# Patient Record
Sex: FEMALE | Race: BLACK OR AFRICAN AMERICAN | NOT HISPANIC OR LATINO | Employment: FULL TIME | URBAN - METROPOLITAN AREA
[De-identification: names, ages, dates, MRNs, and addresses within clinical notes are randomized per-mention and may not be internally consistent; named-entity substitution may affect disease eponyms.]

---

## 2017-03-09 ENCOUNTER — ALLSCRIPTS OFFICE VISIT (OUTPATIENT)
Dept: OTHER | Facility: OTHER | Age: 67
End: 2017-03-09

## 2017-07-20 ENCOUNTER — ALLSCRIPTS OFFICE VISIT (OUTPATIENT)
Dept: OTHER | Facility: OTHER | Age: 67
End: 2017-07-20

## 2017-07-20 DIAGNOSIS — I87.2 VENOUS INSUFFICIENCY (CHRONIC) (PERIPHERAL): ICD-10-CM

## 2017-07-20 DIAGNOSIS — E11.65 TYPE 2 DIABETES MELLITUS WITH HYPERGLYCEMIA (HCC): ICD-10-CM

## 2017-08-22 ENCOUNTER — GENERIC CONVERSION - ENCOUNTER (OUTPATIENT)
Dept: OTHER | Facility: OTHER | Age: 67
End: 2017-08-22

## 2017-08-22 LAB
AMBIG ABBREV CMP14 DEFAULT (HISTORICAL): NORMAL
AMBIG ABBREV LP DEFAULT (HISTORICAL): NORMAL

## 2017-08-23 LAB
A/G RATIO (HISTORICAL): 1.2 (ref 1.2–2.2)
ALBUMIN SERPL BCP-MCNC: 3.7 G/DL (ref 3.6–4.8)
ALP SERPL-CCNC: 68 IU/L (ref 39–117)
ALT SERPL W P-5'-P-CCNC: 32 IU/L (ref 0–32)
AST SERPL W P-5'-P-CCNC: 28 IU/L (ref 0–40)
BASOPHILS # BLD AUTO: 0 %
BASOPHILS # BLD AUTO: 0 X10E3/UL (ref 0–0.2)
BILIRUB SERPL-MCNC: 0.2 MG/DL (ref 0–1.2)
BUN SERPL-MCNC: 15 MG/DL (ref 8–27)
BUN/CREA RATIO (HISTORICAL): 20 (ref 12–28)
CALCIUM SERPL-MCNC: 9.5 MG/DL (ref 8.7–10.3)
CHLORIDE SERPL-SCNC: 102 MMOL/L (ref 96–106)
CHOLEST SERPL-MCNC: 159 MG/DL (ref 100–199)
CO2 SERPL-SCNC: 24 MMOL/L (ref 18–29)
CREAT SERPL-MCNC: 0.74 MG/DL (ref 0.57–1)
CREATININE, URINE (HISTORICAL): 61.6 MG/DL
DEPRECATED RDW RBC AUTO: 13.7 % (ref 12.3–15.4)
EGFR AFRICAN AMERICAN (HISTORICAL): 97 ML/MIN/1.73
EGFR-AMERICAN CALC (HISTORICAL): 84 ML/MIN/1.73
EOSINOPHIL # BLD AUTO: 0.3 X10E3/UL (ref 0–0.4)
EOSINOPHIL # BLD AUTO: 4 %
GLUCOSE SERPL-MCNC: 158 MG/DL (ref 65–99)
HBA1C MFR BLD HPLC: 7.4 % (ref 4.8–5.6)
HCT VFR BLD AUTO: 38.5 % (ref 34–46.6)
HDLC SERPL-MCNC: 41 MG/DL
HGB BLD-MCNC: 12.4 G/DL (ref 11.1–15.9)
IMM.GRANULOCYTES (CD4/8) (HISTORICAL): 0 %
IMM.GRANULOCYTES (CD4/8) (HISTORICAL): 0 X10E3/UL (ref 0–0.1)
LDLC SERPL CALC-MCNC: 84 MG/DL (ref 0–99)
LYMPHOCYTES # BLD AUTO: 1.7 X10E3/UL (ref 0.7–3.1)
LYMPHOCYTES # BLD AUTO: 22 %
MCH RBC QN AUTO: 26.5 PG (ref 26.6–33)
MCHC RBC AUTO-ENTMCNC: 32.2 G/DL (ref 31.5–35.7)
MCV RBC AUTO: 82 FL (ref 79–97)
MICROALBUM.,U,RANDOM (HISTORICAL): 20.2 UG/ML
MICROALBUMIN/CREATININE RATIO (HISTORICAL): 32.8 MG/G CREAT (ref 0–30)
MONOCYTES # BLD AUTO: 0.6 X10E3/UL (ref 0.1–0.9)
MONOCYTES (HISTORICAL): 8 %
NEUTROPHILS # BLD AUTO: 5.1 X10E3/UL (ref 1.4–7)
NEUTROPHILS # BLD AUTO: 66 %
PLATELET # BLD AUTO: 281 X10E3/UL (ref 150–379)
POTASSIUM SERPL-SCNC: 4.6 MMOL/L (ref 3.5–5.2)
RBC (HISTORICAL): 4.68 X10E6/UL (ref 3.77–5.28)
SODIUM SERPL-SCNC: 141 MMOL/L (ref 134–144)
TOT. GLOBULIN, SERUM (HISTORICAL): 3.2 G/DL (ref 1.5–4.5)
TOTAL PROTEIN (HISTORICAL): 6.9 G/DL (ref 6–8.5)
TRIGL SERPL-MCNC: 168 MG/DL (ref 0–149)
TSH SERPL DL<=0.05 MIU/L-ACNC: 5.35 UIU/ML (ref 0.45–4.5)
VLDLC SERPL CALC-MCNC: 34 MG/DL (ref 5–40)
WBC # BLD AUTO: 7.8 X10E3/UL (ref 3.4–10.8)

## 2017-08-31 ENCOUNTER — ALLSCRIPTS OFFICE VISIT (OUTPATIENT)
Dept: OTHER | Facility: OTHER | Age: 67
End: 2017-08-31

## 2017-08-31 DIAGNOSIS — R94.6 ABNORMAL RESULTS OF THYROID FUNCTION STUDIES: ICD-10-CM

## 2017-09-01 ENCOUNTER — GENERIC CONVERSION - ENCOUNTER (OUTPATIENT)
Dept: OTHER | Facility: OTHER | Age: 67
End: 2017-09-01

## 2017-09-02 LAB
T3FREE SERPL-MCNC: 3.1 PG/ML (ref 2–4.4)
T4 FREE SERPL-MCNC: 1.07 NG/DL (ref 0.82–1.77)
TSH SERPL DL<=0.05 MIU/L-ACNC: 3.12 UIU/ML (ref 0.45–4.5)

## 2017-09-06 ENCOUNTER — GENERIC CONVERSION - ENCOUNTER (OUTPATIENT)
Dept: OTHER | Facility: OTHER | Age: 67
End: 2017-09-06

## 2017-09-08 ENCOUNTER — GENERIC CONVERSION - ENCOUNTER (OUTPATIENT)
Dept: OTHER | Facility: OTHER | Age: 67
End: 2017-09-08

## 2017-09-14 ENCOUNTER — HOSPITAL ENCOUNTER (OUTPATIENT)
Dept: RADIOLOGY | Age: 67
Discharge: HOME | End: 2017-09-14
Payer: COMMERCIAL

## 2017-09-14 ENCOUNTER — GENERIC CONVERSION - ENCOUNTER (OUTPATIENT)
Dept: OTHER | Facility: OTHER | Age: 67
End: 2017-09-14

## 2017-09-14 DIAGNOSIS — R94.6 ABNORMAL RESULTS OF THYROID FUNCTION STUDIES: ICD-10-CM

## 2017-09-14 DIAGNOSIS — I87.2 VENOUS INSUFFICIENCY (CHRONIC) (PERIPHERAL): ICD-10-CM

## 2017-09-14 DIAGNOSIS — E11.65 TYPE 2 DIABETES MELLITUS WITH HYPERGLYCEMIA (HCC): ICD-10-CM

## 2017-09-14 PROCEDURE — 76536 US EXAM OF HEAD AND NECK: CPT

## 2017-09-14 PROCEDURE — G0202 SCR MAMMO BI INCL CAD: HCPCS

## 2017-10-05 DIAGNOSIS — Z12.31 ENCOUNTER FOR SCREENING MAMMOGRAM FOR MALIGNANT NEOPLASM OF BREAST: ICD-10-CM

## 2017-10-05 DIAGNOSIS — Z00.00 ENCOUNTER FOR GENERAL ADULT MEDICAL EXAMINATION WITHOUT ABNORMAL FINDINGS: ICD-10-CM

## 2017-12-01 DIAGNOSIS — E11.65 TYPE 2 DIABETES MELLITUS WITH HYPERGLYCEMIA (HCC): ICD-10-CM

## 2018-01-12 NOTE — RESULT NOTES
Verified Results  [facility]) Thyroxine (T4) Free, Direct, S [id number] 08:31AM [employer]     Test Name Result Flag Reference   T4,Free(Direct) 1 07 ng/dL  0 82-1 77     (1) TSH [id number] 08:31AM [facility]     Test Name Result Flag Reference   TSH 3 120 uIU/mL  0 450-4 500     (1) FREE T3 30Ttx0323 08:31AM [facility] [employer]     Test Name Result Flag Reference   Triiodothyronine,Free,Serum 3 1 pg/mL  2 0-4 4

## 2018-01-13 NOTE — RESULT NOTES
Verified Results  * MAMMO SCREENING BILATERAL W CAD [id number] 01:02PM Yogesh Javed Order Number: [id number]    - Patient Instructions: To schedule this appointment, please contact Central Scheduling at [phone]  Do not wear any perfume, powder, lotion or deodorant on breast or underarm area  Please bring your doctors order, referral (if needed) and insurance information with you on the day of the test  Failure to bring this information may result in this test being rescheduled  Arrive 15 minutes prior to your appointment time to register  On the day of your test, please bring any prior mammogram or breast studies with you that were not performed at a [facility]  Failure to bring prior exams may result in your test needing to be rescheduled  Test Name Result Flag Reference   MAMMO SCREENING BILATERAL W CAD (Report)     Patient History:   Patient is postmenopausal and is nulliparous  Family history of breast cancer at age 48 in maternal aunt,    ovarian cancer at age [de-identified] in mother, prostate cancer at age 61 in    brother, breast cancer at age 61 in sister  No Hormone Replacement Therapy   Patient has never smoked  Patient's BMI is 35 5  Reason for exam: screening, asymptomatic  Mammo Screening Bilateral W CAD: September 14, 2017 - Check In #:   [de-identified]   Bilateral CC and MLO view(s) were taken  Technologist: RT Krishan(TOMMY)(M)   Prior study comparison: August 4, 2016, mammo screening bilateral   W CAD performed at [facility]  September 10,    2014, digital bilateral screening mammogram performed at [facility]  April 8, 2013, digital bilateral    screening mammogram performed at [facility]  April 2, 2012, digital bilateral screening mammogram performed at   [facility]  February 14, 2011, digital    bilateral screening mammogram performed at [facility]       There are scattered fibroglandular densities  The parenchymal pattern appears stable  No dominant soft tissue    mass or suspicious calcifications are noted  The skin and nipple   contours are within normal limits  No mammographic evidence of malignancy  No    significant changes when compared with prior studies  ACR BI-RADSï¾® Assessments: BiRad:1 - Negative     Recommendation:   Routine screening mammogram in 1 year  Analyzed by CAD     The patient is scheduled in a reminder system for screening    mammography  8-10% of cancers will be missed on mammography  Management of a    palpable abnormality must be based on clinical grounds  Patients   will be notified of their results via letter from our facility  Accredited by [employer] of Radiology and FDA  Transcription Location: [facility] 98: [id number]     Risk Value(s):   Tyrer-Cuzick 10 Year: 12 400%, Tyrer-Cuzick Lifetime: 22 700%,    Myriad Table: 3 0%, LAUREN 5 Year: 2 7%, NCI Lifetime: 8 7%, MRS    : Based on personal and/or family history,    consideration of hereditary risk assessment may be warranted     Signed by:   Rachell Harmon MD   9/14/17

## 2018-01-13 NOTE — PROGRESS NOTES
Assessment    1  TMJ syndrome (524 69) (M26 629)   2  Acute URI (465 9) (J06 9)    Plan  Acute URI    · Azithromycin 250 MG Oral Tablet; 2tabs stat on day one then 1tab daily day 2 thr 5  Encounter for gynecological examination    · (1) THIN PREP PAP WITH IMAGING; Status:Temporary Deferral - Pt refuses;    3/9/2018  Maturation index required? : No  HPV? : Never  TMJ syndrome    · Meloxicam 15 MG Oral Tablet (Mobic); TAKE 1 TABLET DAILY AS NEEDED    Discussion/Summary    Consider dentist consult  Chief Complaint  patient presented here for left ear pain      History of Present Illness  HPI: pt complains of left ear pain  sharp pain and down her left neck  denies grinding her teeth  it hurts to chew  +dry cough      Review of Systems    Constitutional: No fever, no chills, feels well, no tiredness, no recent weight gain or loss  ENT: no ear ache, no loss of hearing, no nosebleeds or nasal discharge, no sore throat or hoarseness  Cardiovascular: no complaints of slow or fast heart rate, no chest pain, no palpitations, no leg claudication or lower extremity edema  Respiratory: no complaints of shortness of breath, no wheezing, no dyspnea on exertion, no orthopnea or PND  Active Problems    1  Arthritis (716 90) (M19 90)   2  Colon cancer screening (V76 51) (Z12 11)   3  Diabetes type 2, uncontrolled (250 02) (E11 65)   4  Encounter for screening for osteoporosis (V82 81) (Z13 820)   5  Encounter for screening mammogram for malignant neoplasm of breast (V76 12)   (Z12 31)   6  Flu vaccine need (V04 81) (Z23)   7  Need for Tdap vaccination (V06 1) (Z23)   8  Shortness of breath on exertion (786 05) (R06 02)   9  Toothache (525 9) (K08 89)   10  Urinary incontinence (788 30) (R32)   11  Venous insufficiency (459 81) (I87 2)   12  Vertigo (780 4) (R42)   13  Wound of lower extremity, left, initial encounter (894 0) (C79 549X)    Past Medical History    1   History of Abdominal pain, epigastric (789 06) (R10 13)   2  History of Acute pain of right thigh (729 5) (M79 651)   3  Acute sinusitis (461 9) (J01 90)   4  History of Arthritis of wrist, right (716 93) (M19 031)   5  History of Cellulitis Of The Ankle (682 6)   6  History of ovarian cyst (V13 29) (Z87 42)   7  History of shortness of breath (V13 89) (Z87 898)   8  History of urinary frequency (V13 09) (Z87 898)   9  History of urinary frequency (V13 09) (Z87 898)   10  History of Kienbock's disease, 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)   11  History of Limb pain (729 5) (M79 609)   12  History of Limb swelling (729 81) (M79 89)   13  History of Localized Soft Tissue Swelling In Both Ankles   14  History of Lumbar strain (847 2) (S39 012A)   15  History of Pain in right wrist (719 43) (M25 531)   16  History of Pain in wrist, unspecified laterality (719 43) (M25 539)  Active Problems And Past Medical History Reviewed: The active problems and past medical history were reviewed and updated today  Family History  Mother    1  Family history of Carcinoma Of The Stomach (V16 0)   2  Family history of Diabetes Mellitus (V18 0)   3  Family history of Hypertension (V17 49)  Family History Reviewed: The family history was reviewed and updated today  Social History    · Denied: History of Alcohol Use (History)   · Daily Coffee Consumption (___ Cups/Day)   · Daily Tea Consumption (___ Cups/Day)   ·    · Never a smoker   · Denied: History of Tobacco Use   · Uses Safety Equipment - Seatbelts  The social history was reviewed and updated today  The social history was reviewed and is unchanged  Surgical History    1  History of Cholecystectomy   2  History of Complete Colonoscopy  Surgical History Reviewed: The surgical history was reviewed and updated today  Current Meds   1  Aspirin 81 MG Oral Tablet Chewable; Take 1 tablet daily; Therapy: 48QUU3077 to (Evaluate:24Jan2017); Last Rx:53Tla7988 Ordered   2   Calcium 500 500-250-200 MG-MG-UNIT Oral Tablet; Therapy: 27Hyt5949 to (Last Rx:98Umu5289)  Requested for: 72Osi4016 Ordered   3  Meclizine HCl - 12 5 MG Oral Tablet; Take 1 tablet 3 times daily as needed; Therapy: 77Dbr6495 to (Last Rx:48Zxs1175)  Requested for: 35Zoq4150 Ordered   4  Multiple Vitamin TABS; Therapy: 67Qfc1004 to (Last Rx:73Kon9997)  Requested for: 29Cbf3224 Ordered   5  Vitamin C 500 MG Oral Tablet Chewable; Therapy: 53Iep8649 to (Last Rx:40Frh0017)  Requested for: 82Ifk4891 Ordered    The medication list was reviewed and updated today  Allergies    1  No Known Drug Allergies    Vitals   Recorded: 13NLM0677 08:12AM   Temperature 97 6 F, Tympanic   Heart Rate 74   Pulse Quality Normal   Respiration Quality Normal   Respiration 16   Systolic 526, RUE, Sitting   Diastolic 72, RUE, Sitting   Height 5 ft 4 in   Weight 219 lb 8 oz   BMI Calculated 37 68   BSA Calculated 2 03   O2 Saturation 97     Physical Exam    Constitutional   General appearance: No acute distress, well appearing and well nourished  Eyes   Conjunctiva and lids: No swelling, erythema or discharge  Pupils and irises: Equal, round and reactive to light  Ears, Nose, Mouth, and Throat   Oropharynx: Abnormal   tenderness over left tmj jt  Pulmonary   Respiratory effort: No increased work of breathing or signs of respiratory distress  Auscultation of lungs: Clear to auscultation  Cardiovascular   Auscultation of heart: Normal rate and rhythm, normal S1 and S2, without murmurs  Examination of extremities for edema and/or varicosities: Normal          Results/Data  PHQ-2 Adult Depression Screening 89VCH2107 08:13AM User, s     Test Name Result Flag Reference   PHQ-2 Adult Depression Screening Negative     PHQ-2 Adult Depression Score 0     Over the last two weeks, how often have you been bothered by any of the following problems?   Little interest or pleasure in doing things: Not at all - 0  Feeling down, depressed, or hopeless: Not at all - 0 Falls Risk Assessment (Dx Z13 89 Screen for Neurologic Disorder) 63MOE8197 08:13AM User, Ahs     Test Name Result Flag Reference   Falls Risk      No falls in the past year       Signatures   Electronically signed by : Aniceto Habermann, DO; Mar  9 2017 10:05AM EST                       (Author)

## 2018-01-14 VITALS
SYSTOLIC BLOOD PRESSURE: 124 MMHG | BODY MASS INDEX: 37.15 KG/M2 | RESPIRATION RATE: 16 BRPM | TEMPERATURE: 97 F | HEART RATE: 70 BPM | WEIGHT: 217.6 LBS | HEIGHT: 64 IN | OXYGEN SATURATION: 97 % | DIASTOLIC BLOOD PRESSURE: 80 MMHG

## 2018-01-14 VITALS
HEART RATE: 68 BPM | RESPIRATION RATE: 16 BRPM | BODY MASS INDEX: 37.94 KG/M2 | DIASTOLIC BLOOD PRESSURE: 74 MMHG | HEIGHT: 64 IN | SYSTOLIC BLOOD PRESSURE: 122 MMHG | TEMPERATURE: 97.6 F | OXYGEN SATURATION: 98 % | WEIGHT: 222.2 LBS

## 2018-01-14 VITALS
BODY MASS INDEX: 37.47 KG/M2 | SYSTOLIC BLOOD PRESSURE: 126 MMHG | WEIGHT: 219.5 LBS | OXYGEN SATURATION: 97 % | HEIGHT: 64 IN | RESPIRATION RATE: 16 BRPM | DIASTOLIC BLOOD PRESSURE: 72 MMHG | HEART RATE: 74 BPM | TEMPERATURE: 97.6 F

## 2018-01-15 NOTE — RESULT NOTES
Calc 95 mg/dL  0-99     (1) HEMOGLOBIN A1C 03LEO0733 08:10AM [employer]     Test Name Result Flag Reference   Hemoglobin A1c 7 1 % H 4 8-5 6   Pre-diabetes: 5 7 - 6 4           Diabetes: >6 4           Glycemic control for adults with diabetes: <7 0     (LC) Sedimentation Rate-Westergren 02WLL0859 08:10AM [employer]     Test Name Result Flag Reference   Sedimentation Rate-Westergren 12 mm/hr  0-40     (1) C-REACTIVE PROTEIN 29IYP2931 08:10AM [employer]     Test Name Result Flag Reference   C-Reactive Protein, Quant 8 7 mg/L H 0 0-4 9     (1) PREALBUMIN 57ZBX5366 08:10AM [employer]     Test Name Result Flag Reference   Prealbumin 14 mg/dL  10-36     [facility]) Cy Pyo ESQ99 Default 83GUK3115 08:10AM [employer]     Test Name Result Flag Reference   Cy Pyo CMP14 Default Comment     A hand-written panel/profile was received from your office  In  accordance with the [facility] Ambiguous Test Code Policy dated July 6005, we have completed your order by using the closest currently  or formerly recognized AMA panel  We have assigned Comprehensive  Metabolic Panel (14), Test Code #135356 to this request   If this  is not the testing you wished to receive on this specimen, please  contact the [address] [place] [address] [employer] Inquiry/Technical Services Department  to clarify the test order  We appreciate your business  [facility]) Cy Pyo LP Default 82MQM1406 08:10AM [employer]     Test Name Result Flag Reference   Ambig Abbrev LP Default Comment     A hand-written panel/profile was received from your office  In  accordance with the [facility] Ambiguous Test Code Policy dated July 1964, we have completed your order by using the closest currently  or formerly recognized AMA panel    We have assigned Lipid Panel,  Test Code #748587 to this request  If this is not the testing you  wished to receive on this specimen, please contact the [address] [place] [address]  [employer] Inquiry/Technical Services Department to clarify the test  order  We appreciate your business

## 2018-02-20 RX ORDER — MULTIVITAMIN
TABLET ORAL
COMMUNITY
Start: 2011-04-20 | End: 2018-09-20

## 2018-02-20 RX ORDER — MELOXICAM 15 MG/1
1 TABLET ORAL DAILY PRN
COMMUNITY
Start: 2017-03-09 | End: 2018-02-21 | Stop reason: SDUPTHER

## 2018-02-20 RX ORDER — ASPIRIN 81 MG/1
1 TABLET, CHEWABLE ORAL DAILY
COMMUNITY
Start: 2016-07-28

## 2018-02-20 RX ORDER — MULTIVIT-MIN/IRON/FOLIC ACID/K 18-600-40
CAPSULE ORAL
COMMUNITY
Start: 2011-04-20

## 2018-02-20 RX ORDER — TOCOPHERSOLAN (VITAMIN E TPGS) 400/15ML
LIQUID (ML) ORAL
COMMUNITY
Start: 2011-04-20 | End: 2018-09-20

## 2018-02-21 ENCOUNTER — OFFICE VISIT (OUTPATIENT)
Dept: FAMILY MEDICINE CLINIC | Facility: CLINIC | Age: 68
End: 2018-02-21
Payer: COMMERCIAL

## 2018-02-21 VITALS
HEIGHT: 64 IN | SYSTOLIC BLOOD PRESSURE: 108 MMHG | RESPIRATION RATE: 16 BRPM | TEMPERATURE: 97.5 F | HEART RATE: 74 BPM | OXYGEN SATURATION: 98 % | DIASTOLIC BLOOD PRESSURE: 64 MMHG | WEIGHT: 221.6 LBS | BODY MASS INDEX: 37.83 KG/M2

## 2018-02-21 DIAGNOSIS — L98.499 SUPERFICIAL ULCER (HCC): Primary | ICD-10-CM

## 2018-02-21 DIAGNOSIS — L30.9 ECZEMA, UNSPECIFIED TYPE: ICD-10-CM

## 2018-02-21 DIAGNOSIS — E11.9 TYPE 2 DIABETES MELLITUS WITHOUT COMPLICATION, WITHOUT LONG-TERM CURRENT USE OF INSULIN (HCC): ICD-10-CM

## 2018-02-21 PROBLEM — E04.1 THYROID NODULE: Status: ACTIVE | Noted: 2017-09-18

## 2018-02-21 PROCEDURE — 99214 OFFICE O/P EST MOD 30 MIN: CPT | Performed by: INTERNAL MEDICINE

## 2018-02-21 RX ORDER — LANCETS
EACH MISCELLANEOUS DAILY
Qty: 100 EACH | Refills: 1 | Status: SHIPPED | OUTPATIENT
Start: 2018-02-21

## 2018-02-21 RX ORDER — SULFAMETHOXAZOLE AND TRIMETHOPRIM 800; 160 MG/1; MG/1
1 TABLET ORAL EVERY 12 HOURS SCHEDULED
Qty: 20 TABLET | Refills: 0 | Status: SHIPPED | OUTPATIENT
Start: 2018-02-21 | End: 2018-03-03

## 2018-02-21 RX ORDER — MELOXICAM 15 MG/1
15 TABLET ORAL DAILY PRN
Qty: 30 TABLET | Refills: 3 | Status: SHIPPED | OUTPATIENT
Start: 2018-02-21 | End: 2018-06-12 | Stop reason: SDUPTHER

## 2018-02-21 RX ORDER — METHYLPREDNISOLONE 4 MG/1
TABLET ORAL
Qty: 21 TABLET | Refills: 0 | Status: SHIPPED | OUTPATIENT
Start: 2018-02-21 | End: 2018-03-12 | Stop reason: ALTCHOICE

## 2018-02-21 NOTE — PROGRESS NOTES
Assessment/Plan:         Diagnoses and all orders for this visit:    Superficial ulcer (HCC)  -     sulfamethoxazole-trimethoprim (BACTRIM DS) 800-160 mg per tablet; Take 1 tablet by mouth every 12 (twelve) hours for 10 days  -     mupirocin (BACTROBAN) 2 % ointment; Apply topically 2 (two) times a day    Type 2 diabetes mellitus without complication, without long-term current use of insulin (HCC)  -     Blood Glucose Monitoring Suppl ([address]) w/Device KIT; by Does not apply route daily  -     ONE TOUCH CLUB LANCETS MISC; by Does not apply route daily  -     glucose blood (ONE TOUCH TEST STRIPS) test strip; Use as instructed  -     metFORMIN (GLUCOPHAGE) 500 mg tablet; Take 1 tablet (500 mg total) by mouth daily with breakfast    Eczema, unspecified type  -     Methylprednisolone 4 MG TBPK; Use as directed on package    Other orders  -     aspirin 81 mg chewable tablet; Chew 1 tablet daily  -     Calcium-Magnesium-Vitamin D (CALCIUM 500) 500-250-200 MG-MG-UNIT TABS; Take by mouth  -     meloxicam (MOBIC) 15 mg tablet; Take 1 tablet by mouth daily as needed  -     Discontinue: metFORMIN (GLUCOPHAGE) 500 mg tablet; Take 1 tablet by mouth daily  -     Multiple Vitamin tablet; Take by mouth  -     Ascorbic Acid (VITAMIN C) 500 MG CAPS; Take by mouth          Subjective:      Patient ID: Aline Bethea is a 76 y o  female  Pt states has rash x 2 weeks  She went to [place] and was given cream(?steroid) and pill(?antihistamine)  She scratched her leg open  The following portions of the patient's history were reviewed and updated as appropriate: She  has no past medical history on file  She   Patient Active Problem List    Diagnosis Date Noted    Thyroid nodule 09/18/2017    Diabetes type 2, uncontrolled ([facility] [address] ) 06/13/2016    Arthritis 06/18/2012     She  has no past surgical history on file  Her family history includes No Known Problems in her mother  She  reports that she has never smoked  She has never used smokeless tobacco  She reports that she does not drink alcohol or use drugs  Current Outpatient Prescriptions   Medication Sig Dispense Refill    Ascorbic Acid (VITAMIN C) 500 MG CAPS Take by mouth      aspirin 81 mg chewable tablet Chew 1 tablet daily      Calcium-Magnesium-Vitamin D (CALCIUM 500) 500-250-200 MG-MG-UNIT TABS Take by mouth      meloxicam (MOBIC) 15 mg tablet Take 1 tablet by mouth daily as needed      metFORMIN (GLUCOPHAGE) 500 mg tablet Take 1 tablet (500 mg total) by mouth daily with breakfast 30 tablet 5    Multiple Vitamin tablet Take by mouth      Blood Glucose Monitoring Suppl ([address]) w/Device KIT by Does not apply route daily 1 each 0    glucose blood (ONE TOUCH TEST STRIPS) test strip Use as instructed 100 each 0    Methylprednisolone 4 MG TBPK Use as directed on package 21 tablet 0    mupirocin (BACTROBAN) 2 % ointment Apply topically 2 (two) times a day 22 g 3    ONE TOUCH CLUB LANCETS MISC by Does not apply route daily 100 each 1    sulfamethoxazole-trimethoprim (BACTRIM DS) 800-160 mg per tablet Take 1 tablet by mouth every 12 (twelve) hours for 10 days 20 tablet 0     No current facility-administered medications for this visit  No current outpatient prescriptions on file prior to visit  No current facility-administered medications on file prior to visit  She has No Known Allergies       Review of Systems   Constitutional: Negative  HENT: Negative  Eyes: Negative  Respiratory: Negative  Cardiovascular: Negative  Objective:      /64 (BP Location: Left arm, Patient Position: Sitting, Cuff Size: Standard)   Pulse 74   Temp 97 5 °F (36 4 °C)   Resp 16   Ht 5' 4" (1 626 m)   Wt 101 kg (221 lb 9 6 oz)   SpO2 98%   BMI 38 04 kg/m²          Physical Exam   Constitutional: She appears well-developed and well-nourished  HENT:   Head: Normocephalic and atraumatic     Skin:   eczma on left arm and superficial ankle ulcer left    Redressed with [facility] [place]

## 2018-02-21 NOTE — PATIENT INSTRUCTIONS
Redressed her superficial ulcer  tx with antibiotic/oinment/add steroid - low dose for rash  She is aware it can push up her sugar and will stop if her gluc is >300

## 2018-03-12 ENCOUNTER — OFFICE VISIT (OUTPATIENT)
Dept: FAMILY MEDICINE CLINIC | Facility: CLINIC | Age: 68
End: 2018-03-12
Payer: COMMERCIAL

## 2018-03-12 VITALS
BODY MASS INDEX: 37.49 KG/M2 | WEIGHT: 219.6 LBS | DIASTOLIC BLOOD PRESSURE: 80 MMHG | HEIGHT: 64 IN | OXYGEN SATURATION: 98 % | HEART RATE: 75 BPM | SYSTOLIC BLOOD PRESSURE: 122 MMHG | TEMPERATURE: 97.9 F

## 2018-03-12 DIAGNOSIS — E11.9 DIABETIC EYE EXAM (HCC): ICD-10-CM

## 2018-03-12 DIAGNOSIS — Z01.00 DIABETIC EYE EXAM (HCC): ICD-10-CM

## 2018-03-12 DIAGNOSIS — I87.2 VENOUS INSUFFICIENCY: ICD-10-CM

## 2018-03-12 DIAGNOSIS — K12.1 HARD PALATE ULCER: ICD-10-CM

## 2018-03-12 DIAGNOSIS — Z12.11 SCREEN FOR COLON CANCER: ICD-10-CM

## 2018-03-12 DIAGNOSIS — Z13.5 SCREENING FOR GLAUCOMA: ICD-10-CM

## 2018-03-12 DIAGNOSIS — I83.023 STASIS ULCER OF ANKLE, LEFT (HCC): ICD-10-CM

## 2018-03-12 DIAGNOSIS — L30.9 DERMATITIS: ICD-10-CM

## 2018-03-12 DIAGNOSIS — L97.329 STASIS ULCER OF ANKLE, LEFT (HCC): ICD-10-CM

## 2018-03-12 DIAGNOSIS — E11.9 TYPE 2 DIABETES MELLITUS WITHOUT COMPLICATION, WITH LONG-TERM CURRENT USE OF INSULIN (HCC): Primary | ICD-10-CM

## 2018-03-12 DIAGNOSIS — E04.1 THYROID NODULE: ICD-10-CM

## 2018-03-12 DIAGNOSIS — Z79.4 TYPE 2 DIABETES MELLITUS WITHOUT COMPLICATION, WITH LONG-TERM CURRENT USE OF INSULIN (HCC): Primary | ICD-10-CM

## 2018-03-12 PROBLEM — R94.6 ABNORMAL FINDING ON THYROID FUNCTION TEST: Status: ACTIVE | Noted: 2017-08-31

## 2018-03-12 LAB
CREAT UR-MCNC: 92.1 MG/DL
MICROALBUMIN UR-MCNC: 11 MG/L (ref 0–20)
MICROALBUMIN/CREAT 24H UR: 12 MG/G CREATININE (ref 0–30)
SL AMB POCT HEMOGLOBIN AIC: NORMAL

## 2018-03-12 PROCEDURE — 82043 UR ALBUMIN QUANTITATIVE: CPT | Performed by: PHYSICIAN ASSISTANT

## 2018-03-12 PROCEDURE — 83036 HEMOGLOBIN GLYCOSYLATED A1C: CPT | Performed by: PHYSICIAN ASSISTANT

## 2018-03-12 PROCEDURE — 82570 ASSAY OF URINE CREATININE: CPT | Performed by: PHYSICIAN ASSISTANT

## 2018-03-12 PROCEDURE — 1101F PT FALLS ASSESS-DOCD LE1/YR: CPT | Performed by: PHYSICIAN ASSISTANT

## 2018-03-12 PROCEDURE — 3061F NEG MICROALBUMINURIA REV: CPT | Performed by: INTERNAL MEDICINE

## 2018-03-12 PROCEDURE — 99214 OFFICE O/P EST MOD 30 MIN: CPT | Performed by: PHYSICIAN ASSISTANT

## 2018-03-12 RX ORDER — CLOTRIMAZOLE AND BETAMETHASONE DIPROPIONATE 10; .64 MG/G; MG/G
CREAM TOPICAL 2 TIMES DAILY
Qty: 30 G | Refills: 0 | Status: SHIPPED | OUTPATIENT
Start: 2018-03-12 | End: 2018-09-20

## 2018-03-12 NOTE — PROGRESS NOTES
Assessment/Plan:         Diagnoses and all orders for this visit:    Type 2 diabetes mellitus without complication, with long-term current use of insulin ([facility])  -     POCT hemoglobin A1c  -     Microalbumin / creatinine urine ratio    Screening for glaucoma    Screen for colon cancer    Diabetic eye exam ([facility] )    Venous insufficiency    Stasis ulcer of ankle, left ([facility])          Subjective:      Patient ID: Yuridia Viera is a 76 y o  female  Patient presents for follow up chronic conditions  Patient has non-insulin diabetes mellitus type 2 controlled with metformin 500 mg once a day  Patient checks her blood sugar daily  Patient has complications of venous stasis dermatitis with ulceration recurrence on the left lower leg currently healing ulceration left inner ankle  A1c in office today was 6 4 urine microalbumin sent to lab  Patient has osteoarthritis for which she takes meloxicam as needed  Diabetic foot exam completed today  The following portions of the patient's history were reviewed and updated as appropriate:   She  has no past medical history on file    She   Patient Active Problem List    Diagnosis Date Noted    Thyroid nodule 09/18/2017    Abnormal finding on thyroid function test 08/31/2017    Diabetes type 2, uncontrolled ([facility] ) 06/13/2016    Arthritis 06/18/2012    Venous insufficiency 06/18/2012     Current Outpatient Prescriptions   Medication Sig Dispense Refill    Ascorbic Acid (VITAMIN C) 500 MG CAPS Take by mouth      aspirin 81 mg chewable tablet Chew 1 tablet daily      Blood Glucose Monitoring Suppl ([address]) w/Device KIT by Does not apply route daily 1 each 0    Calcium-Magnesium-Vitamin D (CALCIUM 500) 500-250-200 MG-MG-UNIT TABS Take by mouth      glucose blood (ONE TOUCH TEST STRIPS) test strip Use as instructed 100 each 0    meloxicam (MOBIC) 15 mg tablet Take 1 tablet (15 mg total) by mouth daily as needed for moderate pain 30 tablet 3    metFORMIN (GLUCOPHAGE) 500 mg tablet Take 1 tablet (500 mg total) by mouth daily with breakfast 30 tablet 5    Multiple Vitamin tablet Take by mouth      mupirocin (BACTROBAN) 2 % ointment Apply topically 2 (two) times a day 22 g 3    ONE TOUCH CLUB LANCETS MISC by Does not apply route daily 100 each 1     No current facility-administered medications for this visit  Current Outpatient Prescriptions on File Prior to Visit   Medication Sig    Ascorbic Acid (VITAMIN C) 500 MG CAPS Take by mouth    aspirin 81 mg chewable tablet Chew 1 tablet daily    Blood Glucose Monitoring Suppl ([address]) w/Device KIT by Does not apply route daily    Calcium-Magnesium-Vitamin D (CALCIUM 500) 500-250-200 MG-MG-UNIT TABS Take by mouth    glucose blood (ONE TOUCH TEST STRIPS) test strip Use as instructed    meloxicam (MOBIC) 15 mg tablet Take 1 tablet (15 mg total) by mouth daily as needed for moderate pain    metFORMIN (GLUCOPHAGE) 500 mg tablet Take 1 tablet (500 mg total) by mouth daily with breakfast    Multiple Vitamin tablet Take by mouth    mupirocin (BACTROBAN) 2 % ointment Apply topically 2 (two) times a day    ONE TOUCH CLUB LANCETS MISC by Does not apply route daily    [DISCONTINUED] Methylprednisolone 4 MG TBPK Use as directed on package     No current facility-administered medications on file prior to visit  She has No Known Allergies       Review of Systems   Constitutional: Positive for fatigue  Negative for unexpected weight change  HENT: Negative for ear pain  Eyes: Negative for visual disturbance  Respiratory: Positive for shortness of breath  Negative for cough, chest tightness and wheezing  Cardiovascular: Negative for chest pain, palpitations and leg swelling  Gastrointestinal: Negative for abdominal pain, constipation and diarrhea  Genitourinary: Positive for urgency  Negative for dysuria  Neurological: Positive for dizziness   Negative for syncope, light-headedness and headaches  Objective:      /80 (BP Location: Left arm, Patient Position: Sitting, Cuff Size: Standard)   Pulse 75   Temp 97 9 °F (36 6 °C)   Ht 5' 4" (1 626 m)   Wt 99 6 kg (219 lb 9 6 oz)   SpO2 98%   BMI 37 69 kg/m²          Physical Exam   Constitutional: She is oriented to person, place, and time  She appears well-developed and well-nourished  No distress  Overweight      American  female   HENT:   Head: Normocephalic  Right Ear: External ear normal    Left Ear: External ear normal    Pt  Has  Ulceration   Hard  Palate  Roof of  Mouth    Possible  Thrush  From  Recent  Steroid  Use  Eyes: Conjunctivae are normal  Pupils are equal, round, and reactive to light  Neck: Carotid bruit is not present  No thyromegaly present  Cardiovascular: Normal rate, regular rhythm, normal heart sounds and intact distal pulses  Pulses are no weak pulses  No murmur heard  Pulses:       Dorsalis pedis pulses are 2+ on the left side  Pulmonary/Chest: Effort normal and breath sounds normal    Abdominal: Soft  Bowel sounds are normal  She exhibits no mass  There is no tenderness  Surgical  Scar  Right upper  quadrant   Musculoskeletal: She exhibits no edema  Feet:   Right Foot:   Skin Integrity: Negative for ulcer, skin breakdown, erythema, warmth, callus or dry skin  Left Foot:   Skin Integrity: Positive for ulcer (pt  has  chronic  venous  stasis  changes  right  lower  ankle  recurrent  healling  stasis  ulcer)  Lymphadenopathy:     She has no cervical adenopathy  Neurological: She is alert and oriented to person, place, and time  Skin: Skin is warm and dry  Rash noted  Very  Superficial  Noninfected  Healing  Stasis  Ulcer   Left inner  Ankle  Dry  ithcy  Rash   Top  Of  Back  Pt has  Been  Applying  lotrisone  Cream  With  relief   Psychiatric: She has a normal mood and affect   Her behavior is normal  Thought content normal      Patient's shoes and socks removed  Right Foot/Ankle   Right Foot Inspection  Skin Exam: skin normal and skin intact no dry skin, no warmth, no callus, no erythema, no maceration, no abnormal color, no pre-ulcer, no ulcer and no callus                          Toe Exam: ROM and strength within normal limits  Sensory   Vibration: intact    Monofilament testing: intact      Left Foot/Ankle  Left Foot Inspection  Skin Exam: ulcer (pt  has  chronic  venous  stasis  changes  right  lower  ankle  recurrent  healling  stasis  ulcer)                         Toe Exam: ROM and strength within normal limits                   Sensory   Vibration: intact    Monofilament: intact  Vascular    The left DP pulse is 2+  Assign Risk Category:  Deformity present; No loss of protective sensation;  No weak pulses       Risk: 1

## 2018-03-18 DIAGNOSIS — E04.1 NONTOXIC SINGLE THYROID NODULE: ICD-10-CM

## 2018-05-19 DIAGNOSIS — E11.9 TYPE 2 DIABETES MELLITUS WITHOUT COMPLICATION, WITHOUT LONG-TERM CURRENT USE OF INSULIN (HCC): ICD-10-CM

## 2018-06-12 DIAGNOSIS — L98.499 SUPERFICIAL ULCER (HCC): ICD-10-CM

## 2018-06-12 RX ORDER — MELOXICAM 15 MG/1
15 TABLET ORAL DAILY PRN
Qty: 30 TABLET | Refills: 3 | Status: SHIPPED | OUTPATIENT
Start: 2018-06-12 | End: 2018-10-12 | Stop reason: SDUPTHER

## 2018-09-14 ENCOUNTER — OFFICE VISIT (OUTPATIENT)
Dept: FAMILY MEDICINE CLINIC | Facility: CLINIC | Age: 68
End: 2018-09-14
Payer: COMMERCIAL

## 2018-09-14 VITALS
BODY MASS INDEX: 36.65 KG/M2 | WEIGHT: 220 LBS | SYSTOLIC BLOOD PRESSURE: 110 MMHG | OXYGEN SATURATION: 97 % | HEART RATE: 76 BPM | TEMPERATURE: 97.4 F | HEIGHT: 65 IN | RESPIRATION RATE: 16 BRPM | DIASTOLIC BLOOD PRESSURE: 72 MMHG

## 2018-09-14 DIAGNOSIS — Z00.00 MEDICARE ANNUAL WELLNESS VISIT, SUBSEQUENT: Primary | ICD-10-CM

## 2018-09-14 DIAGNOSIS — Z23 NEED FOR VACCINATION FOR STREP PNEUMONIAE: ICD-10-CM

## 2018-09-14 DIAGNOSIS — M65.331 TRIGGER MIDDLE FINGER OF RIGHT HAND: ICD-10-CM

## 2018-09-14 DIAGNOSIS — Z12.11 SCREEN FOR COLON CANCER: ICD-10-CM

## 2018-09-14 DIAGNOSIS — E11.9 TYPE 2 DIABETES MELLITUS WITHOUT COMPLICATION, WITHOUT LONG-TERM CURRENT USE OF INSULIN (HCC): ICD-10-CM

## 2018-09-14 DIAGNOSIS — I87.2 VENOUS INSUFFICIENCY: ICD-10-CM

## 2018-09-14 DIAGNOSIS — Z13.820 SCREENING FOR OSTEOPOROSIS: ICD-10-CM

## 2018-09-14 DIAGNOSIS — L97.321 VENOUS STASIS ULCER OF LEFT ANKLE LIMITED TO BREAKDOWN OF SKIN WITH VARICOSE VEINS (HCC): ICD-10-CM

## 2018-09-14 DIAGNOSIS — I83.023 VENOUS STASIS ULCER OF LEFT ANKLE LIMITED TO BREAKDOWN OF SKIN WITH VARICOSE VEINS (HCC): ICD-10-CM

## 2018-09-14 LAB — SL AMB POCT HEMOGLOBIN AIC: 6.1

## 2018-09-14 PROCEDURE — 99214 OFFICE O/P EST MOD 30 MIN: CPT | Performed by: PHYSICIAN ASSISTANT

## 2018-09-14 PROCEDURE — G0439 PPPS, SUBSEQ VISIT: HCPCS | Performed by: PHYSICIAN ASSISTANT

## 2018-09-14 PROCEDURE — 83036 HEMOGLOBIN GLYCOSYLATED A1C: CPT | Performed by: PHYSICIAN ASSISTANT

## 2018-09-14 PROCEDURE — 90471 IMMUNIZATION ADMIN: CPT

## 2018-09-14 PROCEDURE — 90670 PCV13 VACCINE IM: CPT

## 2018-09-14 PROCEDURE — 3044F HG A1C LEVEL LT 7.0%: CPT | Performed by: PHYSICIAN ASSISTANT

## 2018-09-14 NOTE — PROGRESS NOTES
Assessment/Plan:     Diagnoses and all orders for this visit:    Medicare annual wellness visit, subsequent    Type 2 diabetes mellitus without complication, without long-term current use of insulin ([facility] [address] )  Comments:    Diabetes is at goal continue low carb diet metformin once a day  Check blood sugar daily  Orders:  -     POCT hemoglobin A1c  -     Comprehensive metabolic panel  -     Lipid panel  -     metFORMIN (GLUCOPHAGE) 500 mg tablet; Take 1 tablet (500 mg total) by mouth daily with breakfast    Screen for colon cancer  -     Ambulatory referral to Colorectal Surgery; Future    Screening for osteoporosis  -     DXA bone density spine hip and pelvis; Future    Need for vaccination for Strep pneumoniae  -     PNEUMOCOCCAL CONJUGATE VACCINE 13-VALENT LESS THAN 5Y0  (Prevnar 13)    Venous insufficiency  Comments:    Continue to wear compression socks elevate legs  Trigger middle finger of right hand  Comments:    Referred orthopedic  Orders:  -     silver sulfadiazine (SILVADENE,SSD) 1 % cream; Apply topically daily    Venous stasis ulcer of left ankle limited to breakdown of skin with varicose veins (HCC)  Comments:   continue compression stockings  Switch to Silvadene cream   Orders:  -     Ambulatory referral to Orthopedic Surgery; Future          Subjective:      Patient ID: Rebekah Caro is a 76 y o  female  Patient presents for follow up chronic conditions  Patient has non-insulin diabetes mellitus type 2  She is currently on metformin 500 mg once a day  Patient checks her blood sugar regularly  Patient is due for routine diabetic eye exam   A1c drawn here in office  Patient also has chronic venous insufficiency she wears compression stockings  He has 1 spot on the left medial malleolus of her ankle that is hard to close  Patient has been using Bactroban ointment will switch to Silvadene  Patient would like to go see Orthopedics for right 3rd finger which has a trigger finger    Patient will get flu vaccine at work Prevnar 13 given today  Patient is due for mammogram thyroid ultrasound and a routine colonoscopy A1c in office is 6 1  The following portions of the patient's history were reviewed and updated as appropriate:   She  has no past medical history on file  She   Patient Active Problem List    Diagnosis Date Noted    Trigger middle finger of right hand 09/14/2018    Venous stasis ulcer of left ankle limited to breakdown of skin with varicose veins (HCC) 09/14/2018    Thyroid nodule 09/18/2017    Abnormal finding on thyroid function test 08/31/2017    Type 2 diabetes mellitus without complication, without long-term current use of insulin ([facility] [address] ) 06/13/2016    Arthritis 06/18/2012    Venous insufficiency 06/18/2012     She  reports that she has never smoked  She has never used smokeless tobacco  She reports that she does not drink alcohol or use drugs    Current Outpatient Prescriptions   Medication Sig Dispense Refill    Ascorbic Acid (VITAMIN C) 500 MG CAPS Take by mouth      aspirin 81 mg chewable tablet Chew 1 tablet daily      Blood Glucose Monitoring Suppl ([address]) w/Device KIT by Does not apply route daily 1 each 0    Calcium-Magnesium-Vitamin D (CALCIUM 500) 500-250-200 MG-MG-UNIT TABS Take by mouth      clotrimazole-betamethasone (LOTRISONE) 1-0 05 % cream Apply topically 2 (two) times a day 30 g 0    meloxicam (MOBIC) 15 mg tablet TAKE 1 TABLET (15 MG TOTAL) BY MOUTH DAILY AS NEEDED FOR MODERATE PAIN 30 tablet 3    metFORMIN (GLUCOPHAGE) 500 mg tablet Take 1 tablet (500 mg total) by mouth daily with breakfast 90 tablet 0    Multiple Vitamin tablet Take by mouth      mupirocin (BACTROBAN) 2 % ointment Apply topically 2 (two) times a day 22 g 3    ONE TOUCH CLUB LANCETS MISC by Does not apply route daily 100 each 1    ONE TOUCH ULTRA TEST test strip USE AS INSTRUCTED 100 each 1    silver sulfadiazine (SILVADENE,SSD) 1 % cream Apply topically daily 50 g 0     No current facility-administered medications for this visit  Current Outpatient Prescriptions on File Prior to Visit   Medication Sig    Ascorbic Acid (VITAMIN C) 500 MG CAPS Take by mouth    aspirin 81 mg chewable tablet Chew 1 tablet daily    Blood Glucose Monitoring Suppl ([address]) w/Device KIT by Does not apply route daily    Calcium-Magnesium-Vitamin D (CALCIUM 500) 500-250-200 MG-MG-UNIT TABS Take by mouth    clotrimazole-betamethasone (LOTRISONE) 1-0 05 % cream Apply topically 2 (two) times a day    meloxicam (MOBIC) 15 mg tablet TAKE 1 TABLET (15 MG TOTAL) BY MOUTH DAILY AS NEEDED FOR MODERATE PAIN    Multiple Vitamin tablet Take by mouth    mupirocin (BACTROBAN) 2 % ointment Apply topically 2 (two) times a day    ONE TOUCH CLUB LANCETS MISC by Does not apply route daily    ONE TOUCH ULTRA TEST test strip USE AS INSTRUCTED    [DISCONTINUED] metFORMIN (GLUCOPHAGE) 500 mg tablet Take 1 tablet (500 mg total) by mouth daily with breakfast    [DISCONTINUED] nystatin (MYCOSTATIN) 100,000 units/mL suspension Apply 5 mL (500,000 Units total) to the mouth or throat 4 (four) times a day (Patient not taking: Reported on 9/14/2018 )     No current facility-administered medications on file prior to visit  She has No Known Allergies       Review of Systems   Constitutional: Negative for activity change, appetite change and unexpected weight change  HENT: Negative for ear pain and sore throat  Eyes: Negative for visual disturbance  Respiratory: Negative for cough, shortness of breath and wheezing  Cardiovascular: Negative for chest pain and leg swelling  Gastrointestinal: Negative for abdominal pain, blood in stool, constipation, diarrhea, nausea and vomiting  Genitourinary: Positive for urgency  Negative for difficulty urinating  Musculoskeletal: Negative for arthralgias and myalgias  Skin: Positive for wound  Negative for rash     Neurological: Negative for dizziness, syncope, light-headedness and headaches  Psychiatric/Behavioral: Negative for self-injury, sleep disturbance and suicidal ideas  The patient is not nervous/anxious  Objective:        Physical Exam   Constitutional: She is oriented to person, place, and time  She appears well-developed and well-nourished  HENT:   Head: Normocephalic  Right Ear: Tympanic membrane and external ear normal    Left Ear: Tympanic membrane and external ear normal    Mouth/Throat: Oropharynx is clear and moist    Eyes: Conjunctivae and EOM are normal  Pupils are equal, round, and reactive to light  Neck: Normal range of motion  No thyromegaly present  Cardiovascular: Normal rate, regular rhythm and normal heart sounds  No murmur heard  Pulmonary/Chest: Effort normal and breath sounds normal  She has no wheezes  Abdominal: Soft  Bowel sounds are normal  She exhibits no mass  There is no tenderness  Musculoskeletal: She exhibits deformity  She exhibits no edema  Right 3rd finger tender at flexor tendon in the palm  Some locking  Lymphadenopathy:     She has no cervical adenopathy  Neurological: She is alert and oriented to person, place, and time  She has normal reflexes  No cranial nerve deficit  Skin: Skin is warm and dry  No rash noted  Small venous stasis ulcer left medial malleolus   Psychiatric: She has a normal mood and affect  Her behavior is normal  Judgment and thought content normal    Nursing note and vitals reviewed

## 2018-09-18 LAB
ALBUMIN SERPL-MCNC: 3.9 G/DL (ref 3.6–4.8)
ALBUMIN/GLOB SERPL: 1.1 {RATIO} (ref 1.2–2.2)
ALP SERPL-CCNC: 63 IU/L (ref 39–117)
ALT SERPL-CCNC: 45 IU/L (ref 0–32)
AMBIG ABBREV DEFAULT: NORMAL
AMBIG ABBREV DEFAULT: NORMAL
AST SERPL-CCNC: 43 IU/L (ref 0–40)
BILIRUB SERPL-MCNC: 0.4 MG/DL (ref 0–1.2)
BUN SERPL-MCNC: 11 MG/DL (ref 8–27)
BUN/CREAT SERPL: 13 (ref 12–28)
CALCIUM SERPL-MCNC: 9.5 MG/DL (ref 8.7–10.3)
CHLORIDE SERPL-SCNC: 100 MMOL/L (ref 96–106)
CHOLEST SERPL-MCNC: 144 MG/DL (ref 100–199)
CO2 SERPL-SCNC: 24 MMOL/L (ref 20–29)
CREAT SERPL-MCNC: 0.83 MG/DL (ref 0.57–1)
GLOBULIN SER-MCNC: 3.4 G/DL (ref 1.5–4.5)
GLUCOSE SERPL-MCNC: 113 MG/DL (ref 65–99)
HDLC SERPL-MCNC: 46 MG/DL
LDLC SERPL CALC-MCNC: 80 MG/DL (ref 0–99)
POTASSIUM SERPL-SCNC: 4.4 MMOL/L (ref 3.5–5.2)
PROT SERPL-MCNC: 7.3 G/DL (ref 6–8.5)
SL AMB EGFR AFRICAN AMERICAN: 84 ML/MIN/1.73
SL AMB EGFR NON AFRICAN AMERICAN: 73 ML/MIN/1.73
SL AMB VLDL CHOLESTEROL CALC: 18 MG/DL (ref 5–40)
SODIUM SERPL-SCNC: 138 MMOL/L (ref 134–144)
TRIGL SERPL-MCNC: 91 MG/DL (ref 0–149)

## 2018-09-20 ENCOUNTER — OFFICE VISIT (OUTPATIENT)
Dept: OBGYN CLINIC | Facility: CLINIC | Age: 68
End: 2018-09-20
Payer: COMMERCIAL

## 2018-09-20 VITALS
DIASTOLIC BLOOD PRESSURE: 77 MMHG | HEART RATE: 72 BPM | SYSTOLIC BLOOD PRESSURE: 119 MMHG | HEIGHT: 66 IN | WEIGHT: 219 LBS | BODY MASS INDEX: 35.2 KG/M2

## 2018-09-20 DIAGNOSIS — M65.331 TRIGGER FINGER, RIGHT MIDDLE FINGER: Primary | ICD-10-CM

## 2018-09-20 PROCEDURE — 20550 NJX 1 TENDON SHEATH/LIGAMENT: CPT | Performed by: ORTHOPAEDIC SURGERY

## 2018-09-20 PROCEDURE — 99213 OFFICE O/P EST LOW 20 MIN: CPT | Performed by: ORTHOPAEDIC SURGERY

## 2018-09-20 RX ORDER — CHLORAL HYDRATE 500 MG
1000 CAPSULE ORAL DAILY
COMMUNITY

## 2018-09-20 RX ORDER — DICLOFENAC POTASSIUM 50 MG/1
100 TABLET, FILM COATED ORAL DAILY
COMMUNITY
End: 2021-07-19 | Stop reason: SDUPTHER

## 2018-09-20 RX ORDER — TRIAMCINOLONE ACETONIDE 40 MG/ML
20 INJECTION, SUSPENSION INTRA-ARTICULAR; INTRAMUSCULAR
Status: COMPLETED | OUTPATIENT
Start: 2018-09-20 | End: 2018-09-20

## 2018-09-20 RX ORDER — LIDOCAINE HYDROCHLORIDE 10 MG/ML
0.5 INJECTION, SOLUTION INFILTRATION; PERINEURAL
Status: COMPLETED | OUTPATIENT
Start: 2018-09-20 | End: 2018-09-20

## 2018-09-20 RX ADMIN — LIDOCAINE HYDROCHLORIDE 0.5 ML: 10 INJECTION, SOLUTION INFILTRATION; PERINEURAL at 10:42

## 2018-09-20 RX ADMIN — TRIAMCINOLONE ACETONIDE 20 MG: 40 INJECTION, SUSPENSION INTRA-ARTICULAR; INTRAMUSCULAR at 10:42

## 2018-09-20 NOTE — PROGRESS NOTES
CHIEF COMPLAINT:  Chief Complaint   Patient presents with    Right Wrist - Pain    Right Middle Finger - Pain, Swelling       SUBJECTIVE:  Linda Nguyễn is a 76y o  year old RHD female who presents to the office for pain and locking of her right long finger  Pt states that's that this has been going on for aprox 3 months  Pt states that this is worse at night  Pt states that she must manually open her finger when it gets stuck  Pt denies any injury to the right hand  Pt denies numbness and tingling  Pt is diabetic and takes metformin  PAST MEDICAL HISTORY:  Past Medical History:   Diagnosis Date    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[facility] [address] )     Skin disorder     Vascular disorder        PAST SURGICAL HISTORY:  History reviewed  No pertinent surgical history      FAMILY HISTORY:  Family History   Problem Relation Age of Onset    Diabetes Mother     Hypertension Mother        SOCIAL HISTORY:  Social History   Substance Use Topics    Smoking status: Never Smoker    Smokeless tobacco: Never Used    Alcohol use No       MEDICATIONS:    Current Outpatient Prescriptions:     Ascorbic Acid (VITAMIN C) 500 MG CAPS, Take by mouth, Disp: , Rfl:     aspirin 81 mg chewable tablet, Chew 1 tablet daily, Disp: , Rfl:     Blood Glucose Monitoring Suppl ([address]) w/Device KIT, by Does not apply route daily, Disp: 1 each, Rfl: 0    diclofenac potassium (CATAFLAM) 50 mg tablet, Take 100 mg by mouth daily, Disp: , Rfl:     meloxicam (MOBIC) 15 mg tablet, TAKE 1 TABLET (15 MG TOTAL) BY MOUTH DAILY AS NEEDED FOR MODERATE PAIN, Disp: 30 tablet, Rfl: 3    metFORMIN (GLUCOPHAGE) 500 mg tablet, Take 1 tablet (500 mg total) by mouth daily with breakfast, Disp: 90 tablet, Rfl: 0    mupirocin (BACTROBAN) 2 % ointment, Apply topically 2 (two) times a day, Disp: 22 g, Rfl: 3    Omega-3 Fatty Acids (FISH OIL) 1,000 mg, Take 1,000 mg by mouth daily, Disp: , Rfl:     ONE TOUCH CLUB LANCETS [facility], by Does not apply route daily, Disp: 100 each, Rfl: 1    ONE TOUCH ULTRA TEST test strip, USE AS INSTRUCTED, Disp: 100 each, Rfl: 1    silver sulfadiazine (SILVADENE,SSD) 1 % cream, Apply topically daily, Disp: 50 g, Rfl: 0    ALLERGIES:  No Known Allergies    REVIEW OF SYSTEMS:  Review of Systems   Constitutional: Negative for chills, fever and unexpected weight change  HENT: Positive for ear pain  Negative for hearing loss, nosebleeds and sore throat  Eyes: Negative for pain, redness and visual disturbance  Respiratory: Negative for cough, shortness of breath and wheezing  Cardiovascular: Negative for chest pain, palpitations and leg swelling  Gastrointestinal: Negative for abdominal pain, nausea and vomiting  Endocrine: Negative for polydipsia and polyuria  Genitourinary: Negative for dysuria and hematuria  Musculoskeletal: Positive for arthralgias and joint swelling  Negative for myalgias  Skin: Positive for wound  Negative for rash  Neurological: Negative for dizziness, numbness and headaches  Psychiatric/Behavioral: Negative for decreased concentration, dysphoric mood and suicidal ideas  The patient is not nervous/anxious          VITALS:  Vitals:    09/20/18 1012   BP: 119/77   Pulse: 72       LABS:  HgA1c:   Lab Results   Component Value Date    HGBA1C 6 1 09/14/2018     BMP:   Lab Results   Component Value Date    GLUCOSE 158 (H) 08/22/2017    CALCIUM 9 5 08/22/2017     08/22/2017    K 4 6 08/22/2017    CO2 24 09/17/2018     09/17/2018    BUN 11 09/17/2018    CREATININE 0 83 09/17/2018       _____________________________________________________  PHYSICAL EXAMINATION:  General: well developed and well nourished, alert, oriented times 3 and appears comfortable  Psychiatric: Normal  HEENT: Trachea Midline, No torticollis  Pulmonary: No audible wheezing or respiratory distress   Skin: No masses, erthema, lacerations, fluctation, ulcerations  Neurovascular: Sensation Intact to the Median, Ulnar, Radial Nerve, Motor Intact to the Median, Ulnar, Radial Nerve and Pulses Intact    MUSCULOSKELETAL EXAMINATION:    right long finger:  Positive palpable nodule over the A1 pulley  Positive tenderness to palpation over A1 pulley  Positive catching  Positive clicking       ___________________________________________________  STUDIES REVIEWED:  No studies reviewed  PROCEDURES PERFORMED:  Hand/upper extremity injection  Date/Time: 9/20/2018 10:42 AM  Consent given by: patient  Site marked: site marked  Timeout: Immediately prior to procedure a time out was called to verify the correct patient, procedure, equipment, support staff and site/side marked as required   Supporting Documentation  Indications: tendon swelling and pain   Procedure Details  Condition:trigger finger Location: long finger - R long A1   Preparation: Patient was prepped and draped in the usual sterile fashion  Ultrasound guidance: no  Medications administered: 0 5 mL lidocaine 1 %; 20 mg triamcinolone acetonide 40 mg/mL  Patient tolerance: patient tolerated the procedure well with no immediate complications  Dressing:  Sterile dressing applied            _____________________________________________________  ASSESSMENT/PLAN:    Right long finger trigger finger  * CSI was administered today with out complications  *Pt was advised of the possible rise in her blood sugar due to the CSI   *Pt was advised to apply warmth to the finger if it continues to be stiff or locks prior to ,manually opening it  Pt will call the office if she has any questions or concerns  Follow Up:  Return in about 2 weeks (around 10/4/2018)  To Do Next Visit:  Re-evaluation of current issue    General Discussions:  Trigger Finger: The anatomy and physiology of trigger finger was discussed with the patient today in the office    Edema and increased contact pressure within the flexor tendons at the A1 pulley can cause pain, crepitation, and triggering or locking of the digit resulting in limitation of function  Symptoms can occur at anytime but are typically worse in the morning or after a brief rest from repetitive activity  Treatment options include resting/nighttime MP blocking splints to decrease edema, oral anti-inflammatory medications, home or formal therapy exercises, up to 2 steroid injections within the tendon sheath, or surgical release  While majority of patients do respond to conservative treatment, up to 20% may require surgical release           Scribe Attestation    I,:   Paz Hazel am acting as a scribe while in the presence of the attending physician :        I,:   Trena John MD personally performed the services described in this documentation    as scribed in my presence :

## 2018-09-24 ENCOUNTER — TELEPHONE (OUTPATIENT)
Dept: FAMILY MEDICINE CLINIC | Facility: CLINIC | Age: 68
End: 2018-09-24

## 2018-09-27 ENCOUNTER — TELEPHONE (OUTPATIENT)
Dept: FAMILY MEDICINE CLINIC | Facility: CLINIC | Age: 68
End: 2018-09-27

## 2018-09-27 DIAGNOSIS — R74.8 ELEVATED LIVER ENZYMES: Primary | ICD-10-CM

## 2018-09-27 NOTE — TELEPHONE ENCOUNTER
Spoke  With  Pt   elevated    ast and  Alt  Repeat   hepatic  Functions  Will  Also  check  Hepatitis  Panel  For  Pt is a nurse

## 2018-10-12 DIAGNOSIS — L98.499 SUPERFICIAL ULCER (HCC): ICD-10-CM

## 2018-10-12 RX ORDER — MELOXICAM 15 MG/1
15 TABLET ORAL DAILY PRN
Qty: 90 TABLET | Refills: 0 | Status: SHIPPED | OUTPATIENT
Start: 2018-10-12 | End: 2018-10-17 | Stop reason: SDUPTHER

## 2018-10-15 DIAGNOSIS — E11.9 TYPE 2 DIABETES MELLITUS WITHOUT COMPLICATION, WITHOUT LONG-TERM CURRENT USE OF INSULIN (HCC): ICD-10-CM

## 2018-10-17 DIAGNOSIS — L98.499 SUPERFICIAL ULCER (HCC): ICD-10-CM

## 2018-10-17 DIAGNOSIS — M17.0 OSTEOARTHRITIS OF BOTH KNEES, UNSPECIFIED OSTEOARTHRITIS TYPE: Primary | ICD-10-CM

## 2018-10-17 RX ORDER — MELOXICAM 15 MG/1
15 TABLET ORAL DAILY PRN
Qty: 90 TABLET | Refills: 0 | Status: SHIPPED | OUTPATIENT
Start: 2018-10-17 | End: 2019-02-20 | Stop reason: SDUPTHER

## 2018-11-12 ENCOUNTER — TRANSCRIBE ORDERS (OUTPATIENT)
Dept: RADIOLOGY | Facility: CLINIC | Age: 68
End: 2018-11-12

## 2018-11-13 ENCOUNTER — OFFICE VISIT (OUTPATIENT)
Dept: FAMILY MEDICINE CLINIC | Facility: CLINIC | Age: 68
End: 2018-11-13
Payer: COMMERCIAL

## 2018-11-13 VITALS
BODY MASS INDEX: 35.1 KG/M2 | HEART RATE: 73 BPM | DIASTOLIC BLOOD PRESSURE: 82 MMHG | HEIGHT: 66 IN | WEIGHT: 218.4 LBS | TEMPERATURE: 97.8 F | OXYGEN SATURATION: 97 % | SYSTOLIC BLOOD PRESSURE: 124 MMHG

## 2018-11-13 DIAGNOSIS — I83.023 VENOUS STASIS ULCER OF LEFT ANKLE LIMITED TO BREAKDOWN OF SKIN WITH VARICOSE VEINS (HCC): Primary | ICD-10-CM

## 2018-11-13 DIAGNOSIS — E11.9 TYPE 2 DIABETES MELLITUS WITHOUT COMPLICATION, WITHOUT LONG-TERM CURRENT USE OF INSULIN (HCC): ICD-10-CM

## 2018-11-13 DIAGNOSIS — L98.499 SUPERFICIAL ULCER (HCC): ICD-10-CM

## 2018-11-13 DIAGNOSIS — L97.321 VENOUS STASIS ULCER OF LEFT ANKLE LIMITED TO BREAKDOWN OF SKIN WITH VARICOSE VEINS (HCC): Primary | ICD-10-CM

## 2018-11-13 DIAGNOSIS — I87.2 VENOUS INSUFFICIENCY: ICD-10-CM

## 2018-11-13 DIAGNOSIS — B37.89 CANDIDIASIS OF BREAST: ICD-10-CM

## 2018-11-13 PROCEDURE — 1160F RVW MEDS BY RX/DR IN RCRD: CPT | Performed by: PHYSICIAN ASSISTANT

## 2018-11-13 PROCEDURE — 99214 OFFICE O/P EST MOD 30 MIN: CPT | Performed by: PHYSICIAN ASSISTANT

## 2018-11-13 PROCEDURE — 3008F BODY MASS INDEX DOCD: CPT | Performed by: PHYSICIAN ASSISTANT

## 2018-11-13 RX ORDER — CEPHALEXIN 500 MG/1
500 CAPSULE ORAL EVERY 12 HOURS SCHEDULED
Qty: 20 CAPSULE | Refills: 0 | Status: SHIPPED | OUTPATIENT
Start: 2018-11-13 | End: 2018-11-23

## 2018-11-13 RX ORDER — BETAMETHASONE DIPROPIONATE 0.5 MG/G
CREAM TOPICAL 2 TIMES DAILY
Qty: 50 G | Refills: 1 | Status: SHIPPED | OUTPATIENT
Start: 2018-11-13 | End: 2021-11-17

## 2018-11-13 RX ORDER — NYSTATIN 100000 U/G
CREAM TOPICAL 2 TIMES DAILY
Qty: 30 G | Refills: 3 | Status: SHIPPED | OUTPATIENT
Start: 2018-11-13 | End: 2021-11-17

## 2018-11-13 NOTE — PROGRESS NOTES
Assessment/Plan:     Diagnoses and all orders for this visit:    Venous stasis ulcer of left ankle limited to breakdown of skin with varicose veins ([facility])  Comments:  P o  Antibiotics ordered  Continue compression stockings  Continue dressings twice a day with Bactroban  Apply prescription strength cortisone cream to lower  Orders:  -     cephalexin (KEFLEX) 500 mg capsule; Take 1 capsule (500 mg total) by mouth every 12 (twelve) hours for 10 days    Type 2 diabetes mellitus without complication, without long-term current use of insulin ([facility])  Comments:  Increase metformin to 500 twice a day  Increase compliance with low carb diet  Orders:  -     metFORMIN (GLUCOPHAGE) 500 mg tablet; Take 1 tablet (500 mg total) by mouth 2 (two) times a day with meals    Venous insufficiency  Comments:  Continue compression stockings  Orders:  -     betamethasone, augmented, (DIPROLENE-AF) 0 05 % cream; Apply topically 2 (two) times a day    Type 2 diabetes mellitus without complication, without long-term current use of insulin ([facility])  Comments:    Diabetes is at goal continue low carb diet metformin once a day  Check blood sugar daily  Orders:  -     metFORMIN (GLUCOPHAGE) 500 mg tablet; Take 1 tablet (500 mg total) by mouth 2 (two) times a day with meals    Candidiasis of breast  -     nystatin (MYCOSTATIN) cream; Apply topically 2 (two) times a day    Superficial ulcer ([address] )  Comments:  Patient may need to  return to wound care management for chronic reoccurring venous stasis ulcers  Orders:  -     mupirocin (BACTROBAN) 2 % ointment; Apply topically 2 (two) times a day          Subjective:      Patient ID: Shaheed Randolph is a 76 y o  female  Patient presents with a rash between and under her breasts  She is applied nystatin ointment with some relief  Patient is known diabetic  She is on metformin 500 mg at breakfast   Patient states her blood sugars have been 180-200  Will increase her metformin to twice a day  Patient has chronic venous stasis dermatitis  Also chronic venous stasis ulcer left medial malleolar region  Patient states this is worse  He has begun dressing with Bactroban ointment  Patient applying topical over-the-counter hydrocortisone for her dry skin with no relief  Patient has 2 2 mm x 2-3 mm deep ulcers  Patient has been to wound care in the past   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  Will follow up in 2 weeks for recheck  Patient may need to revisit wound care again        The following portions of the patient's history were reviewed and updated as appropriate:   She  has a past medical history of 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[facility] [address] ); Skin disorder; and Vascular disorder    She   Patient Active Problem List    Diagnosis Date Noted    Candidiasis of breast 11/13/2018    Elevated liver enzymes 09/27/2018    Trigger middle finger of right hand 09/14/2018    Venous stasis ulcer of left ankle limited to breakdown of skin with varicose veins (HCC) 09/14/2018    Thyroid nodule 09/18/2017    Abnormal finding on thyroid function test 08/31/2017    Type 2 diabetes mellitus without complication, without long-term current use of insulin ([facility] [address] ) 06/13/2016    Arthritis 06/18/2012    Venous insufficiency 06/18/2012     Current Outpatient Prescriptions   Medication Sig Dispense Refill    Ascorbic Acid (VITAMIN C) 500 MG CAPS Take by mouth      aspirin 81 mg chewable tablet Chew 1 tablet daily      betamethasone, augmented, (DIPROLENE-AF) 0 05 % cream Apply topically 2 (two) times a day 50 g 1    Blood Glucose Monitoring Suppl ([address]) w/Device KIT by Does not apply route daily 1 each 0    cephalexin (KEFLEX) 500 mg capsule Take 1 capsule (500 mg total) by mouth every 12 (twelve) hours for 10 days 20 capsule 0    diclofenac potassium (CATAFLAM) 50 mg tablet Take 100 mg by mouth daily      meloxicam (MOBIC) 15 mg tablet Take 1 tablet (15 mg total) by mouth daily as needed for moderate pain 90 tablet 0    metFORMIN (GLUCOPHAGE) 500 mg tablet Take 1 tablet (500 mg total) by mouth 2 (two) times a day with meals 180 tablet 0    mupirocin (BACTROBAN) 2 % ointment Apply topically 2 (two) times a day 22 g 0    nystatin (MYCOSTATIN) cream Apply topically 2 (two) times a day 30 g 3    Omega-3 Fatty Acids (FISH OIL) 1,000 mg Take 1,000 mg by mouth daily      ONE TOUCH CLUB LANCETS MISC by Does not apply route daily 100 each 1    ONE TOUCH ULTRA TEST test strip USE AS INSTRUCTED 100 each 1    silver sulfadiazine (SILVADENE,SSD) 1 % cream Apply topically daily 50 g 0     No current facility-administered medications for this visit  Current Outpatient Prescriptions on File Prior to Visit   Medication Sig    Ascorbic Acid (VITAMIN C) 500 MG CAPS Take by mouth    aspirin 81 mg chewable tablet Chew 1 tablet daily    Blood Glucose Monitoring Suppl ([address]) w/Device KIT by Does not apply route daily    diclofenac potassium (CATAFLAM) 50 mg tablet Take 100 mg by mouth daily    meloxicam (MOBIC) 15 mg tablet Take 1 tablet (15 mg total) by mouth daily as needed for moderate pain    Omega-3 Fatty Acids (FISH OIL) 1,000 mg Take 1,000 mg by mouth daily    ONE TOUCH CLUB LANCETS MISC by Does not apply route daily    ONE TOUCH ULTRA TEST test strip USE AS INSTRUCTED    silver sulfadiazine (SILVADENE,SSD) 1 % cream Apply topically daily    [DISCONTINUED] metFORMIN (GLUCOPHAGE) 500 mg tablet Take 1 tablet (500 mg total) by mouth daily with breakfast    [DISCONTINUED] mupirocin (BACTROBAN) 2 % ointment Apply topically 2 (two) times a day     No current facility-administered medications on file prior to visit  She has No Known Allergies       Review of Systems   Skin: Positive for rash and wound  Objective:        Physical Exam   Constitutional: She is oriented to person, place, and time  She appears well-developed     Obese  Black  Female Musculoskeletal: She exhibits no edema  Neurological: She is alert and oriented to person, place, and time  Skin: Rash noted  Patient has intertrigo most likely due to Candida under both breasts  Patient has chronic venous stasis dermatitis  Patient has bilateral compression stockings on  Left lower medial malleolar low too small nonhealing venous stasis ulcers  Surrounding area with dry skin  Psychiatric: She has a normal mood and affect  Her behavior is normal  Judgment and thought content normal    Nursing note reviewed

## 2018-11-14 ENCOUNTER — HOSPITAL ENCOUNTER (OUTPATIENT)
Dept: RADIOLOGY | Age: 68
Discharge: HOME | End: 2018-11-14
Payer: COMMERCIAL

## 2018-11-14 ENCOUNTER — TELEPHONE (OUTPATIENT)
Dept: FAMILY MEDICINE CLINIC | Facility: CLINIC | Age: 68
End: 2018-11-14

## 2018-11-14 VITALS — WEIGHT: 218 LBS | HEIGHT: 66 IN | BODY MASS INDEX: 35.03 KG/M2

## 2018-11-14 DIAGNOSIS — Z13.820 SCREENING FOR OSTEOPOROSIS: ICD-10-CM

## 2018-11-14 DIAGNOSIS — Z12.31 ENCOUNTER FOR SCREENING MAMMOGRAM FOR MALIGNANT NEOPLASM OF BREAST: ICD-10-CM

## 2018-11-14 PROCEDURE — 77067 SCR MAMMO BI INCL CAD: CPT

## 2018-11-14 PROCEDURE — 77080 DXA BONE DENSITY AXIAL: CPT

## 2018-11-14 NOTE — TELEPHONE ENCOUNTER
----- Message from Nick Leung PA-C sent at 11/14/2018 12:03 PM EST -----  Call  Pt  Her dexa scan  Is normal

## 2019-02-20 DIAGNOSIS — M17.0 OSTEOARTHRITIS OF BOTH KNEES, UNSPECIFIED OSTEOARTHRITIS TYPE: ICD-10-CM

## 2019-02-20 RX ORDER — MELOXICAM 15 MG/1
TABLET ORAL
Qty: 90 TABLET | Refills: 0 | Status: SHIPPED | OUTPATIENT
Start: 2019-02-20 | End: 2019-09-07 | Stop reason: SDUPTHER

## 2019-09-07 DIAGNOSIS — M17.0 OSTEOARTHRITIS OF BOTH KNEES, UNSPECIFIED OSTEOARTHRITIS TYPE: ICD-10-CM

## 2019-09-09 ENCOUNTER — TELEPHONE (OUTPATIENT)
Dept: FAMILY MEDICINE CLINIC | Facility: CLINIC | Age: 69
End: 2019-09-09

## 2019-09-09 RX ORDER — MELOXICAM 15 MG/1
TABLET ORAL
Qty: 30 TABLET | Refills: 2 | Status: SHIPPED | OUTPATIENT
Start: 2019-09-09 | End: 2021-05-24

## 2019-09-09 NOTE — TELEPHONE ENCOUNTER
----- Message from Leticia Marie MA sent at 8/26/2019 11:44 AM EDT -----  Regarding: APPT  Due for Appt for chronic conditions

## 2019-10-03 ENCOUNTER — TELEPHONE (OUTPATIENT)
Dept: FAMILY MEDICINE CLINIC | Facility: CLINIC | Age: 69
End: 2019-10-03

## 2019-10-03 NOTE — TELEPHONE ENCOUNTER
----- Message from Dena Vargas MA sent at 10/1/2019 11:24 AM EDT -----  Regarding: need dm appt  Patient overdue for diabetes check

## 2019-10-04 DIAGNOSIS — E11.9 TYPE 2 DIABETES MELLITUS WITHOUT COMPLICATION, WITHOUT LONG-TERM CURRENT USE OF INSULIN (HCC): ICD-10-CM

## 2020-02-01 DIAGNOSIS — E11.9 TYPE 2 DIABETES MELLITUS WITHOUT COMPLICATION, WITHOUT LONG-TERM CURRENT USE OF INSULIN (HCC): ICD-10-CM

## 2020-03-01 DIAGNOSIS — E11.9 TYPE 2 DIABETES MELLITUS WITHOUT COMPLICATION, WITHOUT LONG-TERM CURRENT USE OF INSULIN (HCC): ICD-10-CM

## 2020-05-29 ENCOUNTER — TELEPHONE (OUTPATIENT)
Dept: FAMILY MEDICINE CLINIC | Facility: CLINIC | Age: 70
End: 2020-05-29

## 2020-08-27 ENCOUNTER — TELEPHONE (OUTPATIENT)
Dept: FAMILY MEDICINE CLINIC | Facility: CLINIC | Age: 70
End: 2020-08-27

## 2020-11-16 ENCOUNTER — TELEPHONE (OUTPATIENT)
Dept: FAMILY MEDICINE CLINIC | Facility: CLINIC | Age: 70
End: 2020-11-16

## 2021-05-24 ENCOUNTER — OFFICE VISIT (OUTPATIENT)
Dept: FAMILY MEDICINE CLINIC | Facility: CLINIC | Age: 71
End: 2021-05-24
Payer: COMMERCIAL

## 2021-05-24 VITALS
WEIGHT: 207 LBS | BODY MASS INDEX: 33.27 KG/M2 | SYSTOLIC BLOOD PRESSURE: 120 MMHG | DIASTOLIC BLOOD PRESSURE: 78 MMHG | TEMPERATURE: 97.4 F | HEIGHT: 66 IN | OXYGEN SATURATION: 99 % | RESPIRATION RATE: 18 BRPM | HEART RATE: 101 BPM

## 2021-05-24 DIAGNOSIS — I87.2 VENOUS INSUFFICIENCY: ICD-10-CM

## 2021-05-24 DIAGNOSIS — E11.9 TYPE 2 DIABETES MELLITUS WITHOUT COMPLICATION, WITHOUT LONG-TERM CURRENT USE OF INSULIN (HCC): Primary | ICD-10-CM

## 2021-05-24 DIAGNOSIS — R19.7 DIARRHEA, UNSPECIFIED TYPE: ICD-10-CM

## 2021-05-24 DIAGNOSIS — Z12.31 ENCOUNTER FOR SCREENING MAMMOGRAM FOR MALIGNANT NEOPLASM OF BREAST: ICD-10-CM

## 2021-05-24 DIAGNOSIS — R10.84 GENERALIZED ABDOMINAL PAIN: ICD-10-CM

## 2021-05-24 PROBLEM — L97.321 VENOUS STASIS ULCER OF LEFT ANKLE LIMITED TO BREAKDOWN OF SKIN WITH VARICOSE VEINS (HCC): Status: RESOLVED | Noted: 2018-09-14 | Resolved: 2021-05-24

## 2021-05-24 PROBLEM — I83.023 VENOUS STASIS ULCER OF LEFT ANKLE LIMITED TO BREAKDOWN OF SKIN WITH VARICOSE VEINS (HCC): Status: RESOLVED | Noted: 2018-09-14 | Resolved: 2021-05-24

## 2021-05-24 LAB — SL AMB POCT HEMOGLOBIN AIC: 5.7 (ref ?–6.5)

## 2021-05-24 PROCEDURE — 99214 OFFICE O/P EST MOD 30 MIN: CPT | Performed by: PHYSICIAN ASSISTANT

## 2021-05-24 PROCEDURE — 83036 HEMOGLOBIN GLYCOSYLATED A1C: CPT | Performed by: PHYSICIAN ASSISTANT

## 2021-05-24 NOTE — PROGRESS NOTES
Diabetic Foot Exam    Patient's shoes and socks removed  Right Foot/Ankle   Right Foot Inspection  Skin Exam: dry skin, callus and callus                          Toe Exam:  no right toe deformity  Sensory   Vibration: intact    Monofilament testing: intact  Vascular    The right DP pulse is 2+  Left Foot/Ankle  Left Foot Inspection  Skin Exam: dry skin and callus                         Toe Exam: no left toe deformity                   Sensory   Vibration: intact    Monofilament: intact  Vascular    The left DP pulse is 2+  Assign Risk Category:  Deformity present; No loss of protective sensation; No weak pulses       Risk: 1   BMI Counseling: Body mass index is 33 41 kg/m²  The BMI is above normal  Nutrition recommendations include decreasing portion sizes, encouraging healthy choices of fruits and vegetables and moderation in carbohydrate intake  Exercise recommendations include exercising 3-5 times per week  Assessment/Plan:     Diagnoses and all orders for this visit:    Type 2 diabetes mellitus without complication, without long-term current use of insulin (HCC)  Comments:  Diabetes is at goal continue current regimen  Orders:  -     POCT hemoglobin A1c  -     Comprehensive metabolic panel  -     Lipid panel  -     Microalbumin / creatinine urine ratio    Encounter for screening mammogram for malignant neoplasm of breast  -     Mammo screening bilateral w 3d & cad; Future    Venous insufficiency  Comments:  Continue compression stockings  No open active ulcerations up this time    Diarrhea, unspecified type  Comments: This has resolved  Patient may return to work tomorrow  Orders:  -     CBC and differential    Generalized abdominal pain  Comments: This has resolved  Orders:  -     CBC and differential    Other orders  -     Cancel: DXA bone density spine hip and pelvis; Future          Subjective:      Patient ID: Gladis Winter is a 70 y o  female      Patient presents in the office to joe as a patient  Patient states she went to work Saturday and she had diarrhea  They sent her home from work for she works at a no [employer] home  Aged check her for COVID infection which was negative  Patient states she had some abdominal pain she was a little weak and dizzy  She had nausea but no vomiting  She had diarrhea which since has resolved  She did not have fever no black stool or no blood in the stool  Patient has non-insulin diabetes mellitus type 2  She is currently on metformin 500 twice a day  She does check her sugar regularly  Patient has chronic venous stasis and history of venous stasis ulcers which are recurrent  Currently no open active ulcers at this time period  Hemoglobin A1c done in the office today is 5 7  We will continue her current regimen  For GI symptoms have resolved    Patient may return to work tomorrow      The following portions of the patient's history were reviewed and updated as appropriate:   She   Patient Active Problem List    Diagnosis Date Noted    Encounter for screening mammogram for malignant neoplasm of breast 05/24/2021    Candidiasis of breast 11/13/2018    Elevated liver enzymes 09/27/2018    Trigger middle finger of right hand 09/14/2018    Thyroid nodule 09/18/2017    Abnormal finding on thyroid function test 08/31/2017    Type 2 diabetes mellitus without complication, without long-term current use of insulin ([facility] [address] ) 06/13/2016    Arthritis 06/18/2012    Venous insufficiency 06/18/2012     Current Outpatient Medications   Medication Sig Dispense Refill    Ascorbic Acid (VITAMIN C) 500 MG CAPS Take by mouth      aspirin 81 mg chewable tablet Chew 1 tablet daily      betamethasone, augmented, (DIPROLENE-AF) 0 05 % cream Apply topically 2 (two) times a day 50 g 1    Blood Glucose Monitoring Suppl ([address]) w/Device KIT by Does not apply route daily 1 each 0    diclofenac potassium (CATAFLAM) 50 mg tablet Take 100 mg by mouth daily      metFORMIN (GLUCOPHAGE) 500 mg tablet Take 1 tablet (500 mg total) by mouth 2 (two) times a day with meals 180 tablet 0    metFORMIN (GLUCOPHAGE) 500 mg tablet TAKE 1 TABLET BY MOUTH EVERY DAY WITH BREAKFAST 30 tablet 0    mupirocin (BACTROBAN) 2 % ointment Apply topically 2 (two) times a day 22 g 0    nystatin (MYCOSTATIN) cream Apply topically 2 (two) times a day 30 g 3    Omega-3 Fatty Acids (FISH OIL) 1,000 mg Take 1,000 mg by mouth daily      ONE TOUCH CLUB LANCETS MISC by Does not apply route daily 100 each 1    ONE TOUCH ULTRA TEST test strip USE AS INSTRUCTED 100 each 1    silver sulfadiazine (SILVADENE,SSD) 1 % cream Apply topically daily 50 g 0     No current facility-administered medications for this visit  She has No Known Allergies       Review of Systems   Constitutional: Negative for activity change and unexpected weight change  HENT: Negative for ear pain and sore throat  Eyes: Negative for visual disturbance  Respiratory: Negative for cough, shortness of breath and wheezing  Cardiovascular: Negative for chest pain and leg swelling  Gastrointestinal: Negative for abdominal pain, blood in stool, constipation, diarrhea, nausea and vomiting  Genitourinary: Negative for difficulty urinating and dysuria  Musculoskeletal: Negative for arthralgias and myalgias  Skin: Negative for rash  Neurological: Negative for dizziness, syncope, light-headedness and headaches  Psychiatric/Behavioral: Negative for self-injury, sleep disturbance and suicidal ideas  The patient is not nervous/anxious  Objective:        Physical Exam  Vitals signs and nursing note reviewed  Constitutional:       General: She is not in acute distress  Appearance: She is well-developed  She is obese  She is not diaphoretic  HENT:      Head: Normocephalic and atraumatic        Right Ear: Tympanic membrane, ear canal and external ear normal       Left Ear: Tympanic membrane, ear canal and external ear normal       Mouth/Throat:      Pharynx: No posterior oropharyngeal erythema  Eyes:      Conjunctiva/sclera: Conjunctivae normal       Pupils: Pupils are equal, round, and reactive to light  Neck:      Thyroid: No thyromegaly  Vascular: No carotid bruit  Cardiovascular:      Rate and Rhythm: Normal rate and regular rhythm  Pulses: no weak pulses          Dorsalis pedis pulses are 2+ on the right side and 2+ on the left side  Heart sounds: Normal heart sounds  No murmur  No friction rub  No gallop  Pulmonary:      Effort: Pulmonary effort is normal  No respiratory distress  Breath sounds: Normal breath sounds  No wheezing  Abdominal:      General: Abdomen is protuberant  Bowel sounds are normal  There is no distension  Palpations: Abdomen is soft  There is no mass  Tenderness: There is no abdominal tenderness  Musculoskeletal:      Right lower leg: No edema  Left lower leg: No edema  Feet:      Right foot:      Skin integrity: Callus and dry skin present  Left foot:      Skin integrity: Callus and dry skin present  Lymphadenopathy:      Cervical: No cervical adenopathy  Skin:     General: Skin is warm and dry  Findings: No erythema or rash  Neurological:      General: No focal deficit present  Mental Status: She is alert and oriented to person, place, and time  Psychiatric:         Mood and Affect: Mood normal          Behavior: Behavior normal          Thought Content:  Thought content normal          Judgment: Judgment normal

## 2021-05-24 NOTE — LETTER
May 24, 2021     Patient: Kartik Maier   YOB: 1950   Date of Visit: 5/24/2021       To Whom it May Concern:    Kartik Maier is under my professional care  She was seen in my office on 5/24/2021  She may return to work on 5/25/2021  If you have any questions or concerns, please don't hesitate to call           Sincerely,          Frances Clayton PA-C        CC: No Recipients

## 2021-05-25 ENCOUNTER — TELEPHONE (OUTPATIENT)
Dept: FAMILY MEDICINE CLINIC | Facility: CLINIC | Age: 71
End: 2021-05-25

## 2021-05-26 ENCOUNTER — TELEPHONE (OUTPATIENT)
Dept: FAMILY MEDICINE CLINIC | Facility: CLINIC | Age: 71
End: 2021-05-26

## 2021-05-26 NOTE — TELEPHONE ENCOUNTER
Called patient and LM for her to please call us back with the name of her eye dr  It is not [facility] but during her appt she said she goes to an eye dr in [place] but did not have the name if the doc

## 2021-05-26 NOTE — TELEPHONE ENCOUNTER
Monique called patient and left a message for updated DM eye exam information  Patient did not go to [facility]

## 2021-06-04 ENCOUNTER — TELEPHONE (OUTPATIENT)
Dept: FAMILY MEDICINE CLINIC | Facility: CLINIC | Age: 71
End: 2021-06-04

## 2021-06-14 ENCOUNTER — APPOINTMENT (OUTPATIENT)
Dept: LAB | Facility: CLINIC | Age: 71
End: 2021-06-14
Payer: COMMERCIAL

## 2021-06-14 LAB
ALBUMIN SERPL BCP-MCNC: 3.3 G/DL (ref 3.5–5)
ALP SERPL-CCNC: 62 U/L (ref 46–116)
ALT SERPL W P-5'-P-CCNC: 38 U/L (ref 12–78)
ANION GAP SERPL CALCULATED.3IONS-SCNC: 5 MMOL/L (ref 4–13)
AST SERPL W P-5'-P-CCNC: 28 U/L (ref 5–45)
BASOPHILS # BLD AUTO: 0.06 THOUSANDS/ΜL (ref 0–0.1)
BASOPHILS NFR BLD AUTO: 1 % (ref 0–1)
BILIRUB SERPL-MCNC: 0.49 MG/DL (ref 0.2–1)
BUN SERPL-MCNC: 13 MG/DL (ref 5–25)
CALCIUM ALBUM COR SERPL-MCNC: 9.9 MG/DL (ref 8.3–10.1)
CALCIUM SERPL-MCNC: 9.3 MG/DL (ref 8.3–10.1)
CHLORIDE SERPL-SCNC: 112 MMOL/L (ref 100–108)
CHOLEST SERPL-MCNC: 144 MG/DL (ref 50–200)
CO2 SERPL-SCNC: 24 MMOL/L (ref 21–32)
CREAT SERPL-MCNC: 0.81 MG/DL (ref 0.6–1.3)
CREAT UR-MCNC: 120 MG/DL
EOSINOPHIL # BLD AUTO: 0.33 THOUSAND/ΜL (ref 0–0.61)
EOSINOPHIL NFR BLD AUTO: 4 % (ref 0–6)
ERYTHROCYTE [DISTWIDTH] IN BLOOD BY AUTOMATED COUNT: 13.9 % (ref 11.6–15.1)
GFR SERPL CREATININE-BSD FRML MDRD: 85 ML/MIN/1.73SQ M
GLUCOSE P FAST SERPL-MCNC: 125 MG/DL (ref 65–99)
HCT VFR BLD AUTO: 38 % (ref 34.8–46.1)
HDLC SERPL-MCNC: 45 MG/DL
HGB BLD-MCNC: 12.2 G/DL (ref 11.5–15.4)
IMM GRANULOCYTES # BLD AUTO: 0.01 THOUSAND/UL (ref 0–0.2)
IMM GRANULOCYTES NFR BLD AUTO: 0 % (ref 0–2)
LDLC SERPL CALC-MCNC: 80 MG/DL (ref 0–100)
LYMPHOCYTES # BLD AUTO: 1.89 THOUSANDS/ΜL (ref 0.6–4.47)
LYMPHOCYTES NFR BLD AUTO: 25 % (ref 14–44)
MCH RBC QN AUTO: 26.4 PG (ref 26.8–34.3)
MCHC RBC AUTO-ENTMCNC: 32.1 G/DL (ref 31.4–37.4)
MCV RBC AUTO: 82 FL (ref 82–98)
MICROALBUMIN UR-MCNC: 18.2 MG/L (ref 0–20)
MICROALBUMIN/CREAT 24H UR: 15 MG/G CREATININE (ref 0–30)
MONOCYTES # BLD AUTO: 0.69 THOUSAND/ΜL (ref 0.17–1.22)
MONOCYTES NFR BLD AUTO: 9 % (ref 4–12)
NEUTROPHILS # BLD AUTO: 4.65 THOUSANDS/ΜL (ref 1.85–7.62)
NEUTS SEG NFR BLD AUTO: 61 % (ref 43–75)
NONHDLC SERPL-MCNC: 99 MG/DL
NRBC BLD AUTO-RTO: 0 /100 WBCS
PLATELET # BLD AUTO: 293 THOUSANDS/UL (ref 149–390)
PMV BLD AUTO: 12.5 FL (ref 8.9–12.7)
POTASSIUM SERPL-SCNC: 4.2 MMOL/L (ref 3.5–5.3)
PROT SERPL-MCNC: 7.9 G/DL (ref 6.4–8.2)
RBC # BLD AUTO: 4.62 MILLION/UL (ref 3.81–5.12)
SODIUM SERPL-SCNC: 141 MMOL/L (ref 136–145)
TRIGL SERPL-MCNC: 96 MG/DL
WBC # BLD AUTO: 7.63 THOUSAND/UL (ref 4.31–10.16)

## 2021-06-14 PROCEDURE — 80053 COMPREHEN METABOLIC PANEL: CPT | Performed by: PHYSICIAN ASSISTANT

## 2021-06-14 PROCEDURE — 36415 COLL VENOUS BLD VENIPUNCTURE: CPT | Performed by: PHYSICIAN ASSISTANT

## 2021-06-14 PROCEDURE — 82043 UR ALBUMIN QUANTITATIVE: CPT | Performed by: PHYSICIAN ASSISTANT

## 2021-06-14 PROCEDURE — 80061 LIPID PANEL: CPT | Performed by: PHYSICIAN ASSISTANT

## 2021-06-14 PROCEDURE — 82570 ASSAY OF URINE CREATININE: CPT | Performed by: PHYSICIAN ASSISTANT

## 2021-06-14 PROCEDURE — 85025 COMPLETE CBC W/AUTO DIFF WBC: CPT | Performed by: PHYSICIAN ASSISTANT

## 2021-07-19 DIAGNOSIS — M19.90 ARTHRITIS: Primary | ICD-10-CM

## 2021-07-19 DIAGNOSIS — E11.9 TYPE 2 DIABETES MELLITUS WITHOUT COMPLICATION, WITHOUT LONG-TERM CURRENT USE OF INSULIN (HCC): ICD-10-CM

## 2021-07-19 RX ORDER — DICLOFENAC POTASSIUM 50 MG/1
50 TABLET, FILM COATED ORAL 2 TIMES DAILY
Qty: 180 TABLET | Refills: 1 | Status: SHIPPED | OUTPATIENT
Start: 2021-07-19

## 2021-07-23 ENCOUNTER — TELEPHONE (OUTPATIENT)
Dept: FAMILY MEDICINE CLINIC | Facility: CLINIC | Age: 71
End: 2021-07-23

## 2021-07-23 NOTE — TELEPHONE ENCOUNTER
----- Message from Rickie Ryder MA sent at 6/21/2021  8:36 AM EDT -----  Regarding: mammo  Patient no showed for her mammo appt  Please call to offer to reschedule

## 2021-11-17 ENCOUNTER — APPOINTMENT (OUTPATIENT)
Dept: RADIOLOGY | Facility: MEDICAL CENTER | Age: 71
End: 2021-11-17
Payer: COMMERCIAL

## 2021-11-17 ENCOUNTER — OFFICE VISIT (OUTPATIENT)
Dept: FAMILY MEDICINE CLINIC | Facility: CLINIC | Age: 71
End: 2021-11-17
Payer: COMMERCIAL

## 2021-11-17 VITALS
DIASTOLIC BLOOD PRESSURE: 70 MMHG | HEART RATE: 80 BPM | BODY MASS INDEX: 33.23 KG/M2 | HEIGHT: 66 IN | OXYGEN SATURATION: 98 % | TEMPERATURE: 97.4 F | WEIGHT: 206.8 LBS | SYSTOLIC BLOOD PRESSURE: 116 MMHG

## 2021-11-17 DIAGNOSIS — E11.22 TYPE 2 DIABETES MELLITUS WITH STAGE 2 CHRONIC KIDNEY DISEASE, WITHOUT LONG-TERM CURRENT USE OF INSULIN (HCC): ICD-10-CM

## 2021-11-17 DIAGNOSIS — N18.2 TYPE 2 DIABETES MELLITUS WITH STAGE 2 CHRONIC KIDNEY DISEASE, WITHOUT LONG-TERM CURRENT USE OF INSULIN (HCC): ICD-10-CM

## 2021-11-17 DIAGNOSIS — N32.81 OVERACTIVE BLADDER: ICD-10-CM

## 2021-11-17 DIAGNOSIS — E11.9 TYPE 2 DIABETES MELLITUS WITHOUT COMPLICATION, WITHOUT LONG-TERM CURRENT USE OF INSULIN (HCC): ICD-10-CM

## 2021-11-17 DIAGNOSIS — R06.02 SHORTNESS OF BREATH: Primary | ICD-10-CM

## 2021-11-17 DIAGNOSIS — R06.02 SHORTNESS OF BREATH: ICD-10-CM

## 2021-11-17 LAB — SL AMB POCT HEMOGLOBIN AIC: 5.8 (ref ?–6.5)

## 2021-11-17 PROCEDURE — 99214 OFFICE O/P EST MOD 30 MIN: CPT | Performed by: FAMILY MEDICINE

## 2021-11-17 PROCEDURE — 71046 X-RAY EXAM CHEST 2 VIEWS: CPT

## 2021-11-17 PROCEDURE — 83036 HEMOGLOBIN GLYCOSYLATED A1C: CPT | Performed by: FAMILY MEDICINE

## 2021-11-17 RX ORDER — OXYBUTYNIN CHLORIDE 5 MG/1
5 TABLET ORAL 2 TIMES DAILY
Qty: 60 TABLET | Refills: 1 | Status: SHIPPED | OUTPATIENT
Start: 2021-11-17

## 2021-12-28 ENCOUNTER — HOSPITAL ENCOUNTER (OUTPATIENT)
Dept: NON INVASIVE DIAGNOSTICS | Facility: CLINIC | Age: 71
Discharge: HOME | End: 2021-12-28
Payer: COMMERCIAL

## 2021-12-28 VITALS
SYSTOLIC BLOOD PRESSURE: 116 MMHG | HEART RATE: 80 BPM | HEIGHT: 66 IN | WEIGHT: 206 LBS | DIASTOLIC BLOOD PRESSURE: 70 MMHG | BODY MASS INDEX: 33.11 KG/M2

## 2021-12-28 DIAGNOSIS — R06.02 SHORTNESS OF BREATH: ICD-10-CM

## 2021-12-28 LAB
AORTIC ROOT: 3.4 CM
APICAL FOUR CHAMBER EJECTION FRACTION: 63 %
AV LVOT PEAK GRADIENT: 3 MMHG
AV PEAK GRADIENT: 6 MMHG
E WAVE DECELERATION TIME: 231 MS
E/A RATIO: 0.96
FRACTIONAL SHORTENING: 30 (ref 28–44)
INTERVENTRICULAR SEPTUM IN DIASTOLE (PARASTERNAL SHORT AXIS VIEW): 1 CM
LEFT ATRIUM AREA SYSTOLE SINGLE PLANE A4C: 12.9 CM2
LEFT INTERNAL DIMENSION IN SYSTOLE: 2.8 CM (ref 2.1–4)
LEFT VENTRICULAR INTERNAL DIMENSION IN DIASTOLE: 4 CM (ref 5.49–8.18)
LEFT VENTRICULAR POSTERIOR WALL IN END DIASTOLE: 0.8 CM
LEFT VENTRICULAR STROKE VOLUME: 43 ML
MV E'TISSUE VEL-SEP: 11 CM/S
MV PEAK A VEL: 0.78 M/S
MV PEAK E VEL: 75 CM/S
MV STENOSIS PRESSURE HALF TIME: 0 MS
MV VALVE AREA P 1/2 METHOD: 3.3
RIGHT ATRIUM AREA SYSTOLE A4C: 10.1 CM2
RIGHT VENTRICLE ID DIMENSION: 3.4 CM
SL CV LV EF: 60
SL CV PED ECHO LEFT VENTRICLE DIASTOLIC VOLUME (MOD BIPLANE) 2D: 71 ML
SL CV PED ECHO LEFT VENTRICLE SYSTOLIC VOLUME (MOD BIPLANE) 2D: 28 ML
TRICUSPID VALVE PEAK REGURGITATION VELOCITY: 2.4 M/S
TRICUSPID VALVE S': 0.8 CM/S
TV PEAK GRADIENT: 23 MMHG

## 2021-12-28 PROCEDURE — 93306 TTE W/DOPPLER COMPLETE: CPT

## 2021-12-28 PROCEDURE — 93306 TTE W/DOPPLER COMPLETE: CPT | Performed by: INTERNAL MEDICINE

## 2021-12-29 ENCOUNTER — TELEPHONE (OUTPATIENT)
Dept: FAMILY MEDICINE CLINIC | Facility: CLINIC | Age: 71
End: 2021-12-29

## 2022-01-04 ENCOUNTER — OFFICE VISIT (OUTPATIENT)
Dept: FAMILY MEDICINE CLINIC | Facility: CLINIC | Age: 72
End: 2022-01-04
Payer: COMMERCIAL

## 2022-01-04 VITALS
BODY MASS INDEX: 32.62 KG/M2 | RESPIRATION RATE: 16 BRPM | TEMPERATURE: 97.7 F | SYSTOLIC BLOOD PRESSURE: 116 MMHG | OXYGEN SATURATION: 97 % | DIASTOLIC BLOOD PRESSURE: 76 MMHG | HEART RATE: 82 BPM | HEIGHT: 66 IN | WEIGHT: 203 LBS

## 2022-01-04 DIAGNOSIS — Z12.11 SCREEN FOR COLON CANCER: ICD-10-CM

## 2022-01-04 DIAGNOSIS — Z20.822 EXPOSURE TO COVID-19 VIRUS: Primary | ICD-10-CM

## 2022-01-04 PROCEDURE — U0005 INFEC AGEN DETEC AMPLI PROBE: HCPCS | Performed by: FAMILY MEDICINE

## 2022-01-04 PROCEDURE — 99213 OFFICE O/P EST LOW 20 MIN: CPT | Performed by: FAMILY MEDICINE

## 2022-01-04 PROCEDURE — U0003 INFECTIOUS AGENT DETECTION BY NUCLEIC ACID (DNA OR RNA); SEVERE ACUTE RESPIRATORY SYNDROME CORONAVIRUS 2 (SARS-COV-2) (CORONAVIRUS DISEASE [COVID-19]), AMPLIFIED PROBE TECHNIQUE, MAKING USE OF HIGH THROUGHPUT TECHNOLOGIES AS DESCRIBED BY CMS-2020-01-R: HCPCS | Performed by: FAMILY MEDICINE

## 2022-01-04 NOTE — ASSESSMENT & PLAN NOTE
Symptom onset: 1/3/22  Date of exposure: unknown  Date of positive test: pending     Symptoms includes: fever, chills, fatigue, rhinorrhea, cough, myalgias and headache      Please maintain appropriate social distancing, wear a mask at all public spaces, wash hands frequently and clean surface after use  If you have fever greater than 104° that does not respond to Tylenol, difficulty eating or drinking, difficulty breathing please report to ER for evaluation

## 2022-01-04 NOTE — PROGRESS NOTES
COVID-19 Outpatient Progress Note    Assessment/Plan:    Problem List Items Addressed This Visit        Other    Exposure to COVID-19 virus - Primary     Symptom onset: 1/3/22  Date of exposure: unknown  Date of positive test: pending     Symptoms includes: fever, chills, fatigue, rhinorrhea, cough, myalgias and headache  Please maintain appropriate social distancing, wear a mask at all public spaces, wash hands frequently and clean surface after use  If you have fever greater than 104° that does not respond to Tylenol, difficulty eating or drinking, difficulty breathing please report to ER for evaluation           Other Visit Diagnoses     Screen for colon cancer             Disposition:     I have spent 15 minutes directly with the patient  Encounter provider Kristen Hernandez MD    Provider located at [address] [place] [address]  [phone]    Recent Visits  Date Type Provider Dept   12/29/21 Telephone MD Wojciech Palmer   Showing recent visits within past 7 days and meeting all other requirements  Today's Visits  Date Type Provider Dept   01/04/22 Office Visit MD Wojciech Palmer   Showing today's visits and meeting all other requirements  Future Appointments  No visits were found meeting these conditions  Showing future appointments within next 150 days and meeting all other requirements     This virtual check-in was done via telephone and she agrees to proceed  Patient agrees to participate in a virtual check in via telephone or video visit instead of presenting to the office to address urgent/immediate medical needs  Patient is aware this is a billable service  After connecting through Telephone, the patient was identified by name and date of birth  Bib Youngblood was informed that this was a telemedicine visit and that the exam was being conducted confidentially over secure lines   My office door was closed  No one else was in the room  Nataliya Paiz acknowledged consent and understanding of privacy and security of the telemedicine visit  I informed the patient that I have reviewed her record in Epic and presented the opportunity for her to ask any questions regarding the visit today  The patient agreed to participate  It was my intent to perform this visit via video technology but the patient was not able to do a video connection so the visit was completed via audio telephone only  Verification of patient location:  Patient is located in the following state in which I hold an active license: [place]    Subjective:   Nataliya Paiz is a 70 y o  female who is concerned about COVID-19  Patient's symptoms include fever, chills, fatigue, rhinorrhea, cough, myalgias and headache  Patient denies malaise, congestion, sore throat, anosmia, loss of taste, shortness of breath, chest tightness, abdominal pain, nausea, vomiting and diarrhea  Date of symptom onset: 1/3/2022  COVID-19 vaccination status: Fully vaccinated (primary series)    Exposure:   Contact with a person who is under investigation (PUI) for or who is positive for COVID-19 within the last 14 days?: Yes    Hospitalized recently for fever and/or lower respiratory symptoms?: No      Currently a healthcare worker that is involved in direct patient care?: No      Works in a special setting where the risk of COVID-19 transmission may be high? (this may include long-term care, correctional and assisted facilities; homeless shelters; assisted-living facilities and group homes ): No      Resident in a special setting where the risk of COVID-19 transmission may be high? (this may include long-term care, correctional and assisted facilities; homeless shelters; assisted-living facilities and group homes ): No      Tmax 101 2, O2 sat 97-98%  Patient state there was sick contact at her job    Patient continues to maintain adequate oral intake and dehydration    Lab Results   Component Value Date    JIA Detected (A) 04/06/2020     Past Medical History:   Diagnosis Date    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[address] )     Skin disorder     Vascular disorder      No past surgical history on file  Current Outpatient Medications   Medication Sig Dispense Refill    Ascorbic Acid (VITAMIN C) 500 MG CAPS Take by mouth      aspirin 81 mg chewable tablet Chew 1 tablet daily      Blood Glucose Monitoring Suppl ([address]) w/Device KIT by Does not apply route daily 1 each 0    diclofenac potassium (CATAFLAM) 50 mg tablet Take 1 tablet (50 mg total) by mouth 2 (two) times a day 180 tablet 1    metFORMIN (GLUCOPHAGE) 500 mg tablet Take 1 tablet (500 mg total) by mouth 2 (two) times a day with meals 180 tablet 0    Omega-3 Fatty Acids (FISH OIL) 1,000 mg Take 1,000 mg by mouth daily      ONE TOUCH CLUB LANCETS MISC by Does not apply route daily 100 each 1    ONE TOUCH ULTRA TEST test strip USE AS INSTRUCTED 100 each 1    oxybutynin (DITROPAN) 5 mg tablet Take 1 tablet (5 mg total) by mouth 2 (two) times a day 60 tablet 1    mupirocin (BACTROBAN) 2 % ointment Apply topically 2 (two) times a day (Patient not taking: Reported on 1/4/2022 ) 22 g 0     No current facility-administered medications for this visit  No Known Allergies    Review of Systems   Constitutional: Positive for chills, fatigue and fever  HENT: Positive for rhinorrhea  Negative for congestion and sore throat  Respiratory: Positive for cough  Negative for chest tightness and shortness of breath  Gastrointestinal: Negative for abdominal pain, diarrhea, nausea and vomiting  Musculoskeletal: Positive for myalgias  Neurological: Positive for headaches       Objective:    Vitals:    01/04/22 0954   BP: 116/76   BP Location: Left arm   Patient Position: Sitting   Cuff Size: Large   Pulse: 82   Resp: 16   Temp: 97 7 °F (36 5 °C)   TempSrc: Temporal   SpO2: 97%   Weight: 92 1 kg (203 lb)   Height: 5' 6" (1 676 m)         VIRTUAL VISIT DISCLAIMER    Ganesh Sarmiento verbally agrees to participate in [employer]  Pt is aware that [employer] could be limited without vital signs or the ability to perform a full hands-on physical Darci Brett understands she or the provider may request at any time to terminate the video visit and request the patient to seek care or treatment in person  MARTHA Alaniz  Family Medicine    Please excuse any "sound-alike" errors that may have ocurred during the process of dictation  Parts of this note have been dictated and there may be errors present in the transcription process  Thank you

## 2022-01-04 NOTE — LETTER
January 4, 2022     Patient: Gladis Winter   YOB: 1950   Date of Visit: 1/4/2022       To Whom it May Concern:    Gladis Winter is under my professional care  She was seen in my office on 1/4/2022  She may return to work on 1/10/22  If you have any questions or concerns, please don't hesitate to call           Sincerely,          Syeda Nieto MD        CC: No Recipients

## 2022-04-06 ENCOUNTER — TELEPHONE (OUTPATIENT)
Dept: FAMILY MEDICINE CLINIC | Facility: CLINIC | Age: 72
End: 2022-04-06

## 2022-04-06 PROBLEM — Z20.822 EXPOSURE TO COVID-19 VIRUS: Status: RESOLVED | Noted: 2022-01-04 | Resolved: 2022-04-06

## 2022-04-06 NOTE — TELEPHONE ENCOUNTER
Called patient, no answer, voice mail full       Spoke with reinaldo, patient sleeping will try back at 120

## 2022-04-08 ENCOUNTER — OFFICE VISIT (OUTPATIENT)
Dept: FAMILY MEDICINE CLINIC | Facility: CLINIC | Age: 72
End: 2022-04-08
Payer: COMMERCIAL

## 2022-04-08 VITALS
TEMPERATURE: 97.6 F | OXYGEN SATURATION: 94 % | HEART RATE: 79 BPM | WEIGHT: 203 LBS | BODY MASS INDEX: 32.62 KG/M2 | SYSTOLIC BLOOD PRESSURE: 120 MMHG | RESPIRATION RATE: 16 BRPM | DIASTOLIC BLOOD PRESSURE: 78 MMHG | HEIGHT: 66 IN

## 2022-04-08 DIAGNOSIS — I87.2 VENOUS INSUFFICIENCY: ICD-10-CM

## 2022-04-08 DIAGNOSIS — Z13.820 SCREENING FOR OSTEOPOROSIS: ICD-10-CM

## 2022-04-08 DIAGNOSIS — Z12.11 SCREEN FOR COLON CANCER: ICD-10-CM

## 2022-04-08 DIAGNOSIS — E11.22 TYPE 2 DIABETES MELLITUS WITH STAGE 2 CHRONIC KIDNEY DISEASE, WITHOUT LONG-TERM CURRENT USE OF INSULIN (HCC): Primary | ICD-10-CM

## 2022-04-08 DIAGNOSIS — N18.2 TYPE 2 DIABETES MELLITUS WITH STAGE 2 CHRONIC KIDNEY DISEASE, WITHOUT LONG-TERM CURRENT USE OF INSULIN (HCC): Primary | ICD-10-CM

## 2022-04-08 DIAGNOSIS — N32.81 OVERACTIVE BLADDER: ICD-10-CM

## 2022-04-08 PROBLEM — R06.02 SHORTNESS OF BREATH: Status: RESOLVED | Noted: 2021-11-17 | Resolved: 2022-04-08

## 2022-04-08 LAB — SL AMB POCT HEMOGLOBIN AIC: 5.8 (ref ?–6.5)

## 2022-04-08 PROCEDURE — 83036 HEMOGLOBIN GLYCOSYLATED A1C: CPT | Performed by: PHYSICIAN ASSISTANT

## 2022-04-08 PROCEDURE — 99214 OFFICE O/P EST MOD 30 MIN: CPT | Performed by: PHYSICIAN ASSISTANT

## 2022-04-08 NOTE — PROGRESS NOTES
Diabetic Foot Exam    Patient's shoes and socks removed  Right Foot/Ankle   Right Foot Inspection  Skin Exam: skin intact, dry skin, callus and callus  Toe Exam: No swelling and erythema    Sensory   Vibration: intact  Monofilament testing: intact    Vascular  The right DP pulse is 2+  Left Foot/Ankle  Left Foot Inspection  Skin Exam: skin intact and callus  Toe Exam: No swelling and no erythema  Sensory   Vibration: intact  Monofilament testing: intact    Vascular  The left DP pulse is 2+  Assign Risk Category  No deformity present  No loss of protective sensation  No weak pulses  Risk: 0  Assessment/Plan:     Diagnoses and all orders for this visit:    Type 2 diabetes mellitus with stage 2 chronic kidney disease, without long-term current use of insulin (HCC)  Comments:  Continue metformin 500 twice a day  Hemoglobin A1c is 5 8  Orders:  -     Microalbumin / creatinine urine ratio  -     Comprehensive metabolic panel  -     Lipid panel  -     POCT hemoglobin A1c  -     Hemoglobin A1C    Venous insufficiency  Comments:  Currently no active lesions continue compression socks    Overactive bladder  Comments:  Using Ditropan p r n  Screen for colon cancer  -     Ambulatory referral for colonoscopy; Future    Screening for osteoporosis  -     DXA bone density spine hip and pelvis; Future          Subjective:      Patient ID: Reed Nieto is a 67 y o  female  Presents in the office for follow-up diabetes  Patient has non-insulin diabetes mellitus type 2 with chronic kidney disease  Her current regimen is metformin 500 mg twice a day  She does check her blood sugar at home  She is compliant with low carb low sugar diet  Patient also has a history of venous stasis ulcer of the left lower leg  This is currently not active in well healed over  He has overactive bladder for which she takes Ditropan 5 mg b i d  P r n  Kate Zuñiga   Patient is due for routine lab work      The following portions of the patient's history were reviewed and updated as appropriate:   She   Patient Active Problem List    Diagnosis Date Noted    Overactive bladder 11/17/2021    Type 2 diabetes mellitus with stage 2 chronic kidney disease, without long-term current use of insulin ([facility] [address] ) 11/17/2021    Encounter for screening mammogram for malignant neoplasm of breast 05/24/2021    Candidiasis of breast 11/13/2018    Elevated liver enzymes 09/27/2018    Trigger middle finger of right hand 09/14/2018    Thyroid nodule 09/18/2017    Abnormal finding on thyroid function test 08/31/2017    Type 2 diabetes mellitus without complication, without long-term current use of insulin (HCC) 06/13/2016    Arthritis 06/18/2012    Venous insufficiency 06/18/2012     Current Outpatient Medications   Medication Sig Dispense Refill    Ascorbic Acid (VITAMIN C) 500 MG CAPS Take by mouth      aspirin 81 mg chewable tablet Chew 1 tablet daily      Blood Glucose Monitoring Suppl ([address]) w/Device KIT by Does not apply route daily 1 each 0    diclofenac potassium (CATAFLAM) 50 mg tablet Take 1 tablet (50 mg total) by mouth 2 (two) times a day 180 tablet 1    metFORMIN (GLUCOPHAGE) 500 mg tablet Take 1 tablet (500 mg total) by mouth 2 (two) times a day with meals 180 tablet 0    mupirocin (BACTROBAN) 2 % ointment Apply topically 2 (two) times a day 22 g 0    Omega-3 Fatty Acids (FISH OIL) 1,000 mg Take 1,000 mg by mouth daily      ONE TOUCH CLUB LANCETS MISC by Does not apply route daily 100 each 1    ONE TOUCH ULTRA TEST test strip USE AS INSTRUCTED 100 each 1    oxybutynin (DITROPAN) 5 mg tablet Take 1 tablet (5 mg total) by mouth 2 (two) times a day 60 tablet 1     No current facility-administered medications for this visit  She has No Known Allergies       Review of Systems   Constitutional: Negative for activity change and unexpected weight change  HENT: Negative for ear pain and sore throat      Eyes: Negative for visual disturbance  Respiratory: Negative for cough, shortness of breath and wheezing  Cardiovascular: Negative for chest pain and leg swelling  Gastrointestinal: Negative for abdominal pain, blood in stool, constipation, diarrhea, nausea and vomiting  Genitourinary: Positive for urgency  Negative for difficulty urinating  Musculoskeletal: Negative for arthralgias and myalgias  Skin: Negative for rash  Neurological: Negative for dizziness, syncope, light-headedness and headaches  Psychiatric/Behavioral: Negative for self-injury, sleep disturbance and suicidal ideas  The patient is not nervous/anxious  Objective:        Physical Exam  Vitals and nursing note reviewed  Constitutional:       General: She is not in acute distress  Appearance: She is well-developed  She is not ill-appearing or diaphoretic  Comments: overweight   HENT:      Head: Normocephalic and atraumatic  Right Ear: Tympanic membrane, ear canal and external ear normal       Left Ear: Tympanic membrane, ear canal and external ear normal    Eyes:      Conjunctiva/sclera: Conjunctivae normal       Pupils: Pupils are equal, round, and reactive to light  Neck:      Thyroid: No thyromegaly  Vascular: No carotid bruit  Cardiovascular:      Rate and Rhythm: Normal rate and regular rhythm  Pulses: no weak pulses          Dorsalis pedis pulses are 2+ on the right side and 2+ on the left side  Heart sounds: Normal heart sounds  No murmur heard  No friction rub  No gallop  Pulmonary:      Effort: Pulmonary effort is normal  No respiratory distress  Breath sounds: Normal breath sounds  No wheezing  Abdominal:      General: Bowel sounds are normal  There is no distension  Palpations: Abdomen is soft  There is no mass  Tenderness: There is no abdominal tenderness  Musculoskeletal:      Right lower leg: No edema  Left lower leg: No edema     Feet:      Right foot:      Skin integrity: Callus and dry skin present  Left foot:      Skin integrity: Callus present  Lymphadenopathy:      Cervical: No cervical adenopathy  Skin:     General: Skin is warm and dry  Findings: No erythema or rash  Neurological:      General: No focal deficit present  Mental Status: She is alert and oriented to person, place, and time  Psychiatric:         Mood and Affect: Mood normal          Behavior: Behavior normal          Thought Content:  Thought content normal          Judgment: Judgment normal

## 2022-04-25 ENCOUNTER — APPOINTMENT (OUTPATIENT)
Dept: LAB | Facility: CLINIC | Age: 72
End: 2022-04-25
Payer: COMMERCIAL

## 2022-04-25 ENCOUNTER — HOSPITAL ENCOUNTER (OUTPATIENT)
Dept: MAMMOGRAPHY | Facility: CLINIC | Age: 72
Discharge: HOME | End: 2022-04-25
Payer: COMMERCIAL

## 2022-04-25 VITALS — BODY MASS INDEX: 32.62 KG/M2 | WEIGHT: 203 LBS | HEIGHT: 66 IN

## 2022-04-25 DIAGNOSIS — Z12.31 ENCOUNTER FOR SCREENING MAMMOGRAM FOR MALIGNANT NEOPLASM OF BREAST: ICD-10-CM

## 2022-04-25 LAB
ALBUMIN SERPL BCP-MCNC: 3.4 G/DL (ref 3.5–5)
ALP SERPL-CCNC: 58 U/L (ref 46–116)
ALT SERPL W P-5'-P-CCNC: 28 U/L (ref 12–78)
ANION GAP SERPL CALCULATED.3IONS-SCNC: 2 MMOL/L (ref 4–13)
AST SERPL W P-5'-P-CCNC: 22 U/L (ref 5–45)
BILIRUB SERPL-MCNC: 1.06 MG/DL (ref 0.2–1)
BUN SERPL-MCNC: 12 MG/DL (ref 5–25)
CALCIUM ALBUM COR SERPL-MCNC: 10 MG/DL (ref 8.3–10.1)
CALCIUM SERPL-MCNC: 9.5 MG/DL (ref 8.3–10.1)
CHLORIDE SERPL-SCNC: 109 MMOL/L (ref 100–108)
CHOLEST SERPL-MCNC: 155 MG/DL
CO2 SERPL-SCNC: 29 MMOL/L (ref 21–32)
CREAT SERPL-MCNC: 0.84 MG/DL (ref 0.6–1.3)
CREAT UR-MCNC: 102 MG/DL
EST. AVERAGE GLUCOSE BLD GHB EST-MCNC: 120 MG/DL
GFR SERPL CREATININE-BSD FRML MDRD: 69 ML/MIN/1.73SQ M
GLUCOSE P FAST SERPL-MCNC: 129 MG/DL (ref 65–99)
HBA1C MFR BLD: 5.8 %
HDLC SERPL-MCNC: 49 MG/DL
LDLC SERPL CALC-MCNC: 78 MG/DL (ref 0–100)
MICROALBUMIN UR-MCNC: 5.6 MG/L (ref 0–20)
MICROALBUMIN/CREAT 24H UR: 5 MG/G CREATININE (ref 0–30)
NONHDLC SERPL-MCNC: 106 MG/DL
POTASSIUM SERPL-SCNC: 4 MMOL/L (ref 3.5–5.3)
PROT SERPL-MCNC: 8 G/DL (ref 6.4–8.2)
SODIUM SERPL-SCNC: 140 MMOL/L (ref 136–145)
TRIGL SERPL-MCNC: 138 MG/DL

## 2022-04-25 PROCEDURE — 80061 LIPID PANEL: CPT | Performed by: PHYSICIAN ASSISTANT

## 2022-04-25 PROCEDURE — 77067 SCR MAMMO BI INCL CAD: CPT

## 2022-04-25 PROCEDURE — 36415 COLL VENOUS BLD VENIPUNCTURE: CPT | Performed by: PHYSICIAN ASSISTANT

## 2022-04-25 PROCEDURE — 82043 UR ALBUMIN QUANTITATIVE: CPT | Performed by: PHYSICIAN ASSISTANT

## 2022-04-25 PROCEDURE — 83036 HEMOGLOBIN GLYCOSYLATED A1C: CPT | Performed by: PHYSICIAN ASSISTANT

## 2022-04-25 PROCEDURE — 77063 BREAST TOMOSYNTHESIS BI: CPT

## 2022-04-25 PROCEDURE — 82570 ASSAY OF URINE CREATININE: CPT | Performed by: PHYSICIAN ASSISTANT

## 2022-04-25 PROCEDURE — 80053 COMPREHEN METABOLIC PANEL: CPT | Performed by: PHYSICIAN ASSISTANT

## 2022-05-17 ENCOUNTER — TELEPHONE (OUTPATIENT)
Dept: FAMILY MEDICINE CLINIC | Facility: CLINIC | Age: 72
End: 2022-05-17

## 2022-06-17 ENCOUNTER — TELEPHONE (OUTPATIENT)
Dept: FAMILY MEDICINE CLINIC | Facility: CLINIC | Age: 72
End: 2022-06-17

## 2022-07-08 LAB
LEFT EYE DIABETIC RETINOPATHY: NORMAL
RIGHT EYE DIABETIC RETINOPATHY: NORMAL
SEVERITY (EYE EXAM): NORMAL

## 2022-07-15 ENCOUNTER — TELEPHONE (OUTPATIENT)
Dept: ADMINISTRATIVE | Facility: OTHER | Age: 72
End: 2022-07-15

## 2022-07-15 NOTE — TELEPHONE ENCOUNTER
----- Message from Angelique Coy sent at 7/15/2022 10:29 AM EDT -----  Regarding: eye exam - [place] [facility]  Hello, our patient Elena Talley has had Diabetic Eye Exam completed/performed  Please assist in updating the patient chart by pulling the document from the Media Tab  The date of service is 07/08/22       Thank you,  Angelique Coy  [facility]

## 2022-07-15 NOTE — TELEPHONE ENCOUNTER
Upon review of the In Basket request we were able to locate, review, and update the patient chart as requested for Diabetic Eye Exam     Any additional questions or concerns should be emailed to the Practice Liaisons via [email or web address] email, please do not reply via In Basket      Thank you  David Gonzalez MA

## 2022-10-03 ENCOUNTER — OFFICE VISIT (OUTPATIENT)
Dept: FAMILY MEDICINE CLINIC | Facility: CLINIC | Age: 72
End: 2022-10-03
Payer: COMMERCIAL

## 2022-10-03 VITALS
HEART RATE: 93 BPM | TEMPERATURE: 98 F | OXYGEN SATURATION: 98 % | WEIGHT: 204.6 LBS | BODY MASS INDEX: 32.88 KG/M2 | SYSTOLIC BLOOD PRESSURE: 122 MMHG | DIASTOLIC BLOOD PRESSURE: 70 MMHG | HEIGHT: 66 IN

## 2022-10-03 DIAGNOSIS — R42 VERTIGO: Primary | ICD-10-CM

## 2022-10-03 DIAGNOSIS — E11.22 TYPE 2 DIABETES MELLITUS WITH STAGE 2 CHRONIC KIDNEY DISEASE, WITHOUT LONG-TERM CURRENT USE OF INSULIN (HCC): ICD-10-CM

## 2022-10-03 DIAGNOSIS — N32.81 OVERACTIVE BLADDER: ICD-10-CM

## 2022-10-03 DIAGNOSIS — N18.2 TYPE 2 DIABETES MELLITUS WITH STAGE 2 CHRONIC KIDNEY DISEASE, WITHOUT LONG-TERM CURRENT USE OF INSULIN (HCC): ICD-10-CM

## 2022-10-03 DIAGNOSIS — E11.9 TYPE 2 DIABETES MELLITUS WITHOUT COMPLICATION, WITHOUT LONG-TERM CURRENT USE OF INSULIN (HCC): ICD-10-CM

## 2022-10-03 LAB — SL AMB POCT HEMOGLOBIN AIC: 5.7 (ref ?–6.5)

## 2022-10-03 PROCEDURE — 83036 HEMOGLOBIN GLYCOSYLATED A1C: CPT | Performed by: PHYSICIAN ASSISTANT

## 2022-10-03 PROCEDURE — 99214 OFFICE O/P EST MOD 30 MIN: CPT | Performed by: PHYSICIAN ASSISTANT

## 2022-10-03 RX ORDER — OXYBUTYNIN CHLORIDE 5 MG/1
5 TABLET ORAL 2 TIMES DAILY
Qty: 60 TABLET | Refills: 1 | Status: SHIPPED | OUTPATIENT
Start: 2022-10-03 | End: 2022-10-18

## 2022-10-03 RX ORDER — MECLIZINE HYDROCHLORIDE 25 MG/1
25 TABLET ORAL EVERY 8 HOURS PRN
Qty: 30 TABLET | Refills: 0 | Status: SHIPPED | OUTPATIENT
Start: 2022-10-03

## 2022-10-03 NOTE — PROGRESS NOTES
BMI Counseling: Body mass index is 33 02 kg/m²  The BMI is above normal  Nutrition recommendations include decreasing portion sizes and moderation in carbohydrate intake  Rationale for BMI follow-up plan is due to patient being overweight or obese  Depression Screening and Follow-up Plan: Patient was screened for depression during today's encounter  They screened negative with a PHQ-2 score of 0  Assessment/Plan:     Diagnoses and all orders for this visit:    Vertigo  Comments:  Meclizine ordered  Patient referred to vestibular therapy  Orders:  -     meclizine (ANTIVERT) 25 mg tablet; Take 1 tablet (25 mg total) by mouth every 8 (eight) hours as needed for dizziness  -     Ambulatory Referral to Physical Therapy; Future    Type 2 diabetes mellitus with stage 2 chronic kidney disease, without long-term current use of insulin ([facility])  Comments:  Continue metformin 500 b i d   Diabetes is at goal   Check blood sugar daily  Orders:  -     POCT hemoglobin A1c  -     Comprehensive metabolic panel; Future  -     Lipid panel; Future  -     Hemoglobin A1C; Future  -     Microalbumin / creatinine urine ratio; Future    Type 2 diabetes mellitus without complication, without long-term current use of insulin ([facility])  Comments:    Diabetes is at goal continue low carb diet   Check blood sugar daily  Orders:  -     metFORMIN (GLUCOPHAGE) 500 mg tablet; Take 1 tablet (500 mg total) by mouth 2 (two) times a day with meals    Overactive bladder  -     oxybutynin (DITROPAN) 5 mg tablet; Take 1 tablet (5 mg total) by mouth 2 (two) times a day          Subjective:      Patient ID: Elan Olivia is a 67 y o  female  Patient presents in the office with dizziness for about 2-3 weeks  Patient states when she moves her eyes or position change she gets room spinning  She has fullness and tinnitus in both her ears  No sore throat no cough  Patient has non-insulin diabetes 2  She does have an eye exam next month    A1c done in the office today is 5 7    Pressure is normal       The following portions of the patient's history were reviewed and updated as appropriate:   She   Patient Active Problem List    Diagnosis Date Noted    Overactive bladder 11/17/2021    Type 2 diabetes mellitus with stage 2 chronic kidney disease, without long-term current use of insulin [facility] 11/17/2021    Encounter for screening mammogram for malignant neoplasm of breast 05/24/2021    Candidiasis of breast 11/13/2018    Elevated liver enzymes 09/27/2018    Trigger middle finger of right hand 09/14/2018    Thyroid nodule 09/18/2017    Abnormal finding on thyroid function test 08/31/2017    Type 2 diabetes mellitus without complication, without long-term current use of insulin ([facility] ) 06/13/2016    Arthritis 06/18/2012    Venous insufficiency 06/18/2012     Current Outpatient Medications   Medication Sig Dispense Refill    Ascorbic Acid (VITAMIN C) 500 MG CAPS Take by mouth      aspirin 81 mg chewable tablet Chew 1 tablet daily      Blood Glucose Monitoring Suppl ([address]) w/Device KIT by Does not apply route daily 1 each 0    diclofenac potassium (CATAFLAM) 50 mg tablet Take 1 tablet (50 mg total) by mouth 2 (two) times a day (Patient taking differently: Take 50 mg by mouth 2 (two) times a day As needed) 180 tablet 1    meclizine (ANTIVERT) 25 mg tablet Take 1 tablet (25 mg total) by mouth every 8 (eight) hours as needed for dizziness 30 tablet 0    metFORMIN (GLUCOPHAGE) 500 mg tablet Take 1 tablet (500 mg total) by mouth 2 (two) times a day with meals 180 tablet 0    Omega-3 Fatty Acids (FISH OIL) 1,000 mg Take 1,000 mg by mouth daily      ONE TOUCH CLUB LANCETS MISC by Does not apply route daily 100 each 1    ONE TOUCH ULTRA TEST test strip USE AS INSTRUCTED 100 each 1    oxybutynin (DITROPAN) 5 mg tablet Take 1 tablet (5 mg total) by mouth 2 (two) times a day 60 tablet 1    mupirocin (BACTROBAN) 2 % ointment Apply topically 2 (two) times a day (Patient not taking: Reported on 10/3/2022) 22 g 0     No current facility-administered medications for this visit  She has No Known Allergies       Review of Systems   Constitutional: Negative for activity change and unexpected weight change  HENT: Positive for tinnitus  Negative for ear pain and sore throat  Eyes: Positive for visual disturbance  Respiratory: Negative for cough, shortness of breath and wheezing  Cardiovascular: Negative for chest pain and leg swelling  Gastrointestinal: Negative for abdominal pain, blood in stool, constipation, diarrhea, nausea and vomiting  Genitourinary: Negative for difficulty urinating  Musculoskeletal: Negative for arthralgias and myalgias  Skin: Negative for rash  Neurological: Positive for dizziness  Negative for syncope, light-headedness and headaches  Psychiatric/Behavioral: Negative for self-injury, sleep disturbance and suicidal ideas  The patient is not nervous/anxious  Objective:        Physical Exam  Vitals and nursing note reviewed  Constitutional:       General: She is not in acute distress  Appearance: She is well-developed  She is obese  She is not ill-appearing or diaphoretic  HENT:      Head: Normocephalic and atraumatic  Right Ear: Tympanic membrane, ear canal and external ear normal       Left Ear: Tympanic membrane, ear canal and external ear normal       Mouth/Throat:      Pharynx: No posterior oropharyngeal erythema  Eyes:      Extraocular Movements: Extraocular movements intact  Conjunctiva/sclera: Conjunctivae normal       Pupils: Pupils are equal, round, and reactive to light  Comments: No  nystagmus   Neck:      Thyroid: No thyromegaly  Vascular: No carotid bruit  Cardiovascular:      Rate and Rhythm: Normal rate and regular rhythm  Pulses: no weak pulses          Dorsalis pedis pulses are 2+ on the right side and 2+ on the left side        Heart sounds: Normal heart sounds  No murmur heard  No friction rub  No gallop  Pulmonary:      Effort: Pulmonary effort is normal  No respiratory distress  Breath sounds: Normal breath sounds  No wheezing  Abdominal:      General: Bowel sounds are normal  There is no distension  Palpations: Abdomen is soft  There is no mass  Tenderness: There is no abdominal tenderness  Musculoskeletal:      Right lower leg: No edema  Left lower leg: No edema  Feet:      Right foot:      Skin integrity: Callus and dry skin present  No ulcer  Left foot:      Skin integrity: Callus and dry skin present  No ulcer  Lymphadenopathy:      Cervical: No cervical adenopathy  Skin:     General: Skin is warm and dry  Findings: No erythema or rash  Neurological:      Mental Status: She is alert and oriented to person, place, and time  Comments: Patient has no nystagmus  Her dizziness is reproduced with patient lying and turning her head to the left  She also has dizziness from changing position from lying to sitting   Psychiatric:         Mood and Affect: Mood normal          Behavior: Behavior normal          Thought Content: Thought content normal          Judgment: Judgment normal       Diabetic Foot Exam    Patient's shoes and socks removed  Right Foot/Ankle   Right Foot Inspection  Skin Exam: skin intact, dry skin, callus and callus  No ulcer  Sensory   Vibration: intact      Vascular  The right DP pulse is 2+  Left Foot/Ankle  Left Foot Inspection  Skin Exam: skin intact, dry skin and callus  No ulcer  Sensory   Vibration: intact  Monofilament testing: intact    Vascular  The left DP pulse is 2+       Assign Risk Category  Deformity present  No loss of protective sensation  No weak pulses  Risk: 0

## 2022-10-17 DIAGNOSIS — N32.81 OVERACTIVE BLADDER: ICD-10-CM

## 2022-10-18 RX ORDER — OXYBUTYNIN CHLORIDE 5 MG/1
TABLET ORAL
Qty: 180 TABLET | Refills: 1 | Status: SHIPPED | OUTPATIENT
Start: 2022-10-18

## 2022-12-21 ENCOUNTER — OFFICE VISIT (OUTPATIENT)
Dept: FAMILY MEDICINE CLINIC | Facility: CLINIC | Age: 72
End: 2022-12-21

## 2022-12-21 VITALS
HEIGHT: 66 IN | DIASTOLIC BLOOD PRESSURE: 72 MMHG | WEIGHT: 201 LBS | HEART RATE: 88 BPM | TEMPERATURE: 98 F | RESPIRATION RATE: 16 BRPM | SYSTOLIC BLOOD PRESSURE: 116 MMHG | BODY MASS INDEX: 32.3 KG/M2 | OXYGEN SATURATION: 97 %

## 2022-12-21 DIAGNOSIS — R05.1 ACUTE COUGH: ICD-10-CM

## 2022-12-21 DIAGNOSIS — R09.81 HEAD CONGESTION: ICD-10-CM

## 2022-12-21 DIAGNOSIS — Z20.822 CLOSE EXPOSURE TO COVID-19 VIRUS: Primary | ICD-10-CM

## 2022-12-21 LAB
FLUAV RNA RESP QL NAA+PROBE: NEGATIVE
FLUBV RNA RESP QL NAA+PROBE: NEGATIVE
SARS-COV-2 RNA RESP QL NAA+PROBE: NEGATIVE

## 2022-12-21 RX ORDER — GUAIFENESIN 600 MG/1
1200 TABLET, EXTENDED RELEASE ORAL EVERY 12 HOURS SCHEDULED
Qty: 56 TABLET | Refills: 0 | Status: SHIPPED | OUTPATIENT
Start: 2022-12-21 | End: 2023-01-04

## 2022-12-21 RX ORDER — BENZONATATE 100 MG/1
100 CAPSULE ORAL 3 TIMES DAILY PRN
Qty: 20 CAPSULE | Refills: 0 | Status: SHIPPED | OUTPATIENT
Start: 2022-12-21

## 2022-12-21 NOTE — PROGRESS NOTES
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, works at a nursing home, many people with positive covid, flu, rsv  Date of positive test: will test at this time    Symptoms includes:fever (101), chills, fatigue, malaise, nasal congestion, rhinorrhea, sore throat, cough, shortness of breath, chest tightness, myalgias and headache    Please maintain appropriate social distancing, wear a mask at all public spaces, wash hands frequently and clean surface after use  If you have fever greater than 104° that does not respond to Tylenol, difficulty eating or drinking, difficulty breathing please report to ER for evaluation    Will start mucinex and tessalon perle for symptomatic relief   Swab for COVID and influenza           Relevant Medications    guaiFENesin (MUCINEX) 600 mg 12 hr tablet    benzonatate (TESSALON PERLES) 100 mg capsule   Other Visit Diagnoses     Acute cough        Relevant Orders    Covid/Flu- Office Collect (Completed)    Head congestion        Relevant Orders    Covid/Flu- Office Collect (Completed)         Disposition:     I have spent 10 minutes directly with the patient  Encounter provider: Lyla Craig MD     Provider located at: [address] [place] [address]  [phone]     Recent Visits  Date Type Provider Dept   12/22/22 Telephone Latia Mistryst Wojciech Ralph   12/21/22 Office Visit MD Wojciech Guerrero   Showing recent visits within past 7 days and meeting all other requirements  Future Appointments  No visits were found meeting these conditions    Showing future appointments within next 150 days and meeting all other requirements     This virtual check-in was done via [employer] [address] [address] and patient was informed that this is a secure, HIPAA-compliant platform  She agrees to proceed  Patient agrees to participate in a virtual check in via telephone or video visit instead of presenting to the office to address urgent/immediate medical needs  Patient is aware this is a billable service  She acknowledged consent and understanding of privacy and security of the video platform  The patient has agreed to participate and understands they can discontinue the visit at any time  After connecting through [facility], the patient was identified by name and date of birth  Helio Canseco was informed that this was a telemedicine visit and that the exam was being conducted confidentially over secure lines  My office door was closed  Helio Canseco acknowledged consent and understanding of privacy and security of the telemedicine visit  I informed the patient that I have reviewed her record in Epic and presented the opportunity for her to ask any questions regarding the visit today  The patient agreed to participate  Subjective:   Helio Canseco is a 67 y o  female who is concerned about COVID-19  Patient's symptoms include fever (101), chills, fatigue, malaise, nasal congestion, rhinorrhea, sore throat, cough, shortness of breath, chest tightness, myalgias and headache  Patient denies anosmia, loss of taste, abdominal pain, nausea, vomiting and diarrhea       - Date of symptom onset: 12/18/2022      COVID-19 vaccination status: Fully vaccinated (primary series)    Exposure:   Contact with a person who is under investigation (PUI) for or who is positive for COVID-19 within the last 14 days?: Yes    Hospitalized recently for fever and/or lower respiratory symptoms?: No      Currently a healthcare worker that is involved in direct patient care?: Yes      Works in a special setting where the risk of COVID-19 transmission may be high? (this may include long-term care, correctional and skilled nursing facilities; homeless shelters; assisted-living facilities and group homes ): Yes Resident in a special setting where the risk of COVID-19 transmission may be high? (this may include long-term care, correctional and [facility] facilities; homeless shelters; assisted-living facilities and group homes ): No      Pt tested for COVID-19 last week  Maintaining adequate hydration   Last tylenol dose 500mg last night  Slight improvement compared to Sunday     Lab Results   Component Value Date    SARSCOV2 Negative 12/21/2022    SARSCORONAVI Detected (A) 04/06/2020       Review of Systems   Constitutional: Positive for chills, fatigue and fever (101)  HENT: Positive for congestion, rhinorrhea and sore throat  Respiratory: Positive for cough, chest tightness and shortness of breath  Gastrointestinal: Negative for abdominal pain, diarrhea, nausea and vomiting  Musculoskeletal: Positive for myalgias  Neurological: Positive for headaches       Current Outpatient Medications on File Prior to Visit   Medication Sig   • Ascorbic Acid (VITAMIN C) 500 MG CAPS Take by mouth   • aspirin 81 mg chewable tablet Chew 1 tablet daily   • Blood Glucose Monitoring Suppl ([address]) w/Device KIT by Does not apply route daily   • diclofenac potassium (CATAFLAM) 50 mg tablet Take 1 tablet (50 mg total) by mouth 2 (two) times a day (Patient taking differently: Take 50 mg by mouth 2 (two) times a day As needed)   • meclizine (ANTIVERT) 25 mg tablet Take 1 tablet (25 mg total) by mouth every 8 (eight) hours as needed for dizziness   • metFORMIN (GLUCOPHAGE) 500 mg tablet Take 1 tablet (500 mg total) by mouth 2 (two) times a day with meals   • Omega-3 Fatty Acids (FISH OIL) 1,000 mg Take 1,000 mg by mouth daily   • ONE TOUCH CLUB LANCETS MISC by Does not apply route daily   • ONE TOUCH ULTRA TEST test strip USE AS INSTRUCTED   • oxybutynin (DITROPAN) 5 mg tablet TAKE 1 TABLET BY MOUTH TWICE A DAY   • mupirocin (BACTROBAN) 2 % ointment Apply topically 2 (two) times a day       Objective:    /72 (BP Location: Right arm, Patient Position: Sitting, Cuff Size: Large)   Pulse 88   Temp 98 °F (36 7 °C) (Temporal)   Resp 16   Ht 5' 6" (1 676 m)   Wt 91 2 kg (201 lb)   SpO2 97%   BMI 32 44 kg/m²      Physical Exam  Vitals reviewed  Constitutional:       Appearance: Normal appearance  Cardiovascular:      Rate and Rhythm: Normal rate and regular rhythm  Pulses: Normal pulses  Pulmonary:      Effort: Pulmonary effort is normal    Abdominal:      General: Abdomen is flat  Musculoskeletal:         General: Normal range of motion  Skin:     General: Skin is warm  Capillary Refill: Capillary refill takes less than 2 seconds  Neurological:      Mental Status: She is alert     Psychiatric:         Mood and Affect: Mood normal             Malcolm Goodman MD

## 2022-12-21 NOTE — LETTER
December 21, 2022     Patient: Yoly Nash  YOB: 1950  Date of Visit: 12/21/2022      To Whom it May Concern:    Yoly Nash is under my professional care  Felicityodalis Georgerosanna was seen in my office on 12/21/2022  Edgar Donaldson may return to work on 12/26/22  If you have any questions or concerns, please don't hesitate to call           Sincerely,          Iwona Salguero MD        CC: No Recipients

## 2022-12-21 NOTE — ASSESSMENT & PLAN NOTE
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, works at a nursing home, many people with positive covid, flu, rsv  Date of positive test: will test at this time    Symptoms includes:fever (101), chills, fatigue, malaise, nasal congestion, rhinorrhea, sore throat, cough, shortness of breath, chest tightness, myalgias and headache    Please maintain appropriate social distancing, wear a mask at all public spaces, wash hands frequently and clean surface after use  If you have fever greater than 104° that does not respond to Tylenol, difficulty eating or drinking, difficulty breathing please report to ER for evaluation    Will start mucinex and tessalon perle for symptomatic relief   Swab for COVID and influenza

## 2022-12-22 ENCOUNTER — TELEPHONE (OUTPATIENT)
Dept: FAMILY MEDICINE CLINIC | Facility: CLINIC | Age: 72
End: 2022-12-22

## 2022-12-22 NOTE — TELEPHONE ENCOUNTER
Pt requesting a letter or copy of her CoVid/Flu test results stating she is negative for both  Pt is required to provide this to her employer      Documentation can be faxed Attn:  Priti Gan at [phone]

## 2023-02-08 ENCOUNTER — OFFICE VISIT (OUTPATIENT)
Dept: FAMILY MEDICINE CLINIC | Facility: CLINIC | Age: 73
End: 2023-02-08

## 2023-02-08 VITALS
SYSTOLIC BLOOD PRESSURE: 96 MMHG | BODY MASS INDEX: 32.21 KG/M2 | WEIGHT: 200.4 LBS | HEIGHT: 66 IN | OXYGEN SATURATION: 97 % | DIASTOLIC BLOOD PRESSURE: 64 MMHG | TEMPERATURE: 98.3 F | HEART RATE: 95 BPM

## 2023-02-08 DIAGNOSIS — R50.9 FEVER, UNSPECIFIED FEVER CAUSE: Primary | ICD-10-CM

## 2023-02-08 DIAGNOSIS — R06.02 SHORTNESS OF BREATH: ICD-10-CM

## 2023-02-08 DIAGNOSIS — M25.511 CHRONIC RIGHT SHOULDER PAIN: ICD-10-CM

## 2023-02-08 DIAGNOSIS — G89.29 CHRONIC RIGHT SHOULDER PAIN: ICD-10-CM

## 2023-02-08 DIAGNOSIS — R35.0 INCREASED URINARY FREQUENCY: ICD-10-CM

## 2023-02-08 DIAGNOSIS — E11.9 TYPE 2 DIABETES MELLITUS WITHOUT COMPLICATION, WITHOUT LONG-TERM CURRENT USE OF INSULIN (HCC): ICD-10-CM

## 2023-02-08 LAB
SL AMB  POCT GLUCOSE, UA: NORMAL
SL AMB LEUKOCYTE ESTERASE,UA: NORMAL
SL AMB POCT BILIRUBIN,UA: NORMAL
SL AMB POCT BLOOD,UA: NORMAL
SL AMB POCT CLARITY,UA: NORMAL
SL AMB POCT COLOR,UA: YELLOW
SL AMB POCT HEMOGLOBIN AIC: 5.9 (ref ?–6.5)
SL AMB POCT KETONES,UA: NORMAL
SL AMB POCT NITRITE,UA: NORMAL
SL AMB POCT PH,UA: 5
SL AMB POCT SPECIFIC GRAVITY,UA: 1.01
SL AMB POCT URINE PROTEIN: NORMAL
SL AMB POCT UROBILINOGEN: NORMAL

## 2023-02-08 RX ORDER — SULFAMETHOXAZOLE AND TRIMETHOPRIM 800; 160 MG/1; MG/1
1 TABLET ORAL EVERY 12 HOURS SCHEDULED
Qty: 20 TABLET | Refills: 0 | Status: SHIPPED | OUTPATIENT
Start: 2023-02-08 | End: 2023-02-18

## 2023-02-08 NOTE — PROGRESS NOTES
Name: Jeovanny Carrillo      : 1950      MRN: [id number]  Encounter Provider: Belia Odell MD  Encounter Date: 2023   Encounter department: [address] [place] [address]     1  Fever, unspecified fever cause  -     XR chest pa & lateral; Future; Expected date: 2023  -     sulfamethoxazole-trimethoprim (BACTRIM DS) 800-160 mg per tablet; Take 1 tablet by mouth every 12 (twelve) hours for 10 days    2  Increased urinary frequency  -     POCT urine dip  -     sulfamethoxazole-trimethoprim (BACTRIM DS) 800-160 mg per tablet; Take 1 tablet by mouth every 12 (twelve) hours for 10 days  -     Urine culture; Future  -     Urine culture    3  Type 2 diabetes mellitus without complication, without long-term current use of insulin ([facility])  -     POCT hemoglobin A1c    4  Chronic right shoulder pain  -     US MSK limited; Future; Expected date: 2023    5  Shortness of breath  -     XR chest pa & lateral; Future; Expected date: 2023      Fever, increased urinary frequency, shortness of breath, unclear etiology of fever however will evaluate for UTI and pneumonia  Patient's pulmonary exam did not demonstrate any abnormal lung sounds, more likely to be UTI, urine dip today shows leukocytes and blood, will start patient on Bactrim for total of 10 days  Please obtain ultrasound of the right shoulder for evaluation of rotator cuff injury  Follow-up in 4 to 6 weeks         Subjective      HPI     51-year-old female patient presents for evaluation of persistent right shoulder pain and fever  Patient reports injuring her right shoulder in 2022  [place]  Her luggage wheel in front of her and veered to the left pulling her right shoulder    Since then patient has been treating her right shoulder conservatively with over-the-counter medication, she has tried ibuprofen, diclofenac, Voltaren gel, Vicks topical cream without significant pulmonary symptoms  Patient no significant worsening of her symptoms this week  In addition to her right shoulder pain, patient reports fever of 101 on Monday morning  Patient had objective temperatures throughout the week has been treating with Tylenol  Denies any recent sick contacts, denies any other symptoms, she does have some baseline shortness of breath with exertion  She has noticed increased urinary frequency  Review of Systems   Constitutional: Positive for appetite change (decreased appetite), chills and fatigue (101)  HENT: Negative for congestion, rhinorrhea and sore throat  Respiratory: Positive for shortness of breath  Negative for cough  Cardiovascular: Negative for chest pain  Gastrointestinal: Positive for nausea  Negative for abdominal pain, constipation, diarrhea and vomiting  Genitourinary: Positive for frequency and urgency  Musculoskeletal: Positive for arthralgias  Neurological: Positive for headaches  Negative for dizziness and light-headedness         Current Outpatient Medications on File Prior to Visit   Medication Sig   • Ascorbic Acid (VITAMIN C) 500 MG CAPS Take by mouth   • aspirin 81 mg chewable tablet Chew 1 tablet daily   • benzonatate (TESSALON PERLES) 100 mg capsule Take 1 capsule (100 mg total) by mouth 3 (three) times a day as needed for cough   • Blood Glucose Monitoring Suppl ([address]) w/Device KIT by Does not apply route daily   • diclofenac potassium (CATAFLAM) 50 mg tablet Take 1 tablet (50 mg total) by mouth 2 (two) times a day (Patient taking differently: Take 50 mg by mouth 2 (two) times a day As needed)   • meclizine (ANTIVERT) 25 mg tablet Take 1 tablet (25 mg total) by mouth every 8 (eight) hours as needed for dizziness   • metFORMIN (GLUCOPHAGE) 500 mg tablet Take 1 tablet (500 mg total) by mouth 2 (two) times a day with meals   • mupirocin (BACTROBAN) 2 % ointment Apply topically 2 (two) times a day   • Omega-3 Fatty Acids (FISH OIL) 1,000 mg Take 1,000 mg by mouth daily   • ONE TOUCH CLUB LANCETS MISC by Does not apply route daily   • ONE TOUCH ULTRA TEST test strip USE AS INSTRUCTED   • oxybutynin (DITROPAN) 5 mg tablet TAKE 1 TABLET BY MOUTH TWICE A DAY       Objective     BP 96/64 (BP Location: Right arm, Patient Position: Sitting, Cuff Size: Large)   Pulse 95   Temp 98 3 °F (36 8 °C) (Temporal)   Ht 5' 6" (1 676 m)   Wt 90 9 kg (200 lb 6 4 oz)   SpO2 97%   BMI 32 35 kg/m²     Physical Exam  Vitals reviewed  Constitutional:       General: She is not in acute distress  Appearance: Normal appearance  She is obese  She is not ill-appearing, toxic-appearing or diaphoretic  Comments: Patient appears uncomfortable due to pain   Cardiovascular:      Rate and Rhythm: Normal rate and regular rhythm  Pulses: Normal pulses  Heart sounds: Normal heart sounds  No murmur heard  Pulmonary:      Effort: Pulmonary effort is normal  No respiratory distress  Breath sounds: Normal breath sounds  Abdominal:      General: Abdomen is flat  Musculoskeletal:         General: Tenderness present  Comments: Exam of the shoulder limited due to pain, patient has significant pain elevating her right shoulder  Significant pain with empty can test  Tenderness over the anterior aspect of the right shoulder   Skin:     General: Skin is warm  Capillary Refill: Capillary refill takes less than 2 seconds  Neurological:      Mental Status: She is alert  Mental status is at baseline     Psychiatric:         Mood and Affect: Mood normal               Lucinda Wade MD yes

## 2023-02-08 NOTE — LETTER
February 8, 2023     Patient: Inga Tyler  YOB: 1950  Date of Visit: 2/8/2023      To Whom it May Concern:    Inga Tyler is under my professional care  Therese Muhammad was seen in my office on 2/8/2023  Therese Muhammad may return to work on 8/14/23  If you have any questions or concerns, please don't hesitate to call           Sincerely,          Onel Wills MD        CC: No Recipients

## 2023-02-09 ENCOUNTER — HOSPITAL ENCOUNTER (OUTPATIENT)
Dept: RADIOLOGY | Facility: HOSPITAL | Age: 73
Discharge: HOME | End: 2023-02-09

## 2023-02-09 ENCOUNTER — TELEPHONE (OUTPATIENT)
Dept: FAMILY MEDICINE CLINIC | Facility: CLINIC | Age: 73
End: 2023-02-09

## 2023-02-09 DIAGNOSIS — R50.9 FEVER, UNSPECIFIED FEVER CAUSE: ICD-10-CM

## 2023-02-09 DIAGNOSIS — G89.29 CHRONIC RIGHT SHOULDER PAIN: ICD-10-CM

## 2023-02-09 DIAGNOSIS — G89.29 CHRONIC RIGHT SHOULDER PAIN: Primary | ICD-10-CM

## 2023-02-09 DIAGNOSIS — M25.511 CHRONIC RIGHT SHOULDER PAIN: Primary | ICD-10-CM

## 2023-02-09 DIAGNOSIS — R06.02 SHORTNESS OF BREATH: ICD-10-CM

## 2023-02-09 DIAGNOSIS — M25.511 CHRONIC RIGHT SHOULDER PAIN: ICD-10-CM

## 2023-02-09 NOTE — TELEPHONE ENCOUNTER
Patient called saying that she couldn't get her ultrasound scheduled until February 24  She stated that she is dying from the pain and was informed that if you place the order STAT she can be seen sooner

## 2023-02-10 LAB — BACTERIA UR CULT: NORMAL

## 2023-02-13 ENCOUNTER — TELEPHONE (OUTPATIENT)
Dept: FAMILY MEDICINE CLINIC | Facility: CLINIC | Age: 73
End: 2023-02-13

## 2023-02-13 ENCOUNTER — OFFICE VISIT (OUTPATIENT)
Dept: OBGYN CLINIC | Facility: CLINIC | Age: 73
End: 2023-02-13

## 2023-02-13 VITALS
RESPIRATION RATE: 18 BRPM | OXYGEN SATURATION: 83 % | HEIGHT: 66 IN | HEART RATE: 56 BPM | SYSTOLIC BLOOD PRESSURE: 102 MMHG | WEIGHT: 201 LBS | BODY MASS INDEX: 32.3 KG/M2 | DIASTOLIC BLOOD PRESSURE: 62 MMHG

## 2023-02-13 DIAGNOSIS — M75.41 SUBACROMIAL IMPINGEMENT OF RIGHT SHOULDER: Primary | ICD-10-CM

## 2023-02-13 RX ADMIN — BUPIVACAINE HYDROCHLORIDE 4 ML: 2.5 INJECTION, SOLUTION INFILTRATION; PERINEURAL at 15:55

## 2023-02-13 RX ADMIN — TRIAMCINOLONE ACETONIDE 40 MG: 40 INJECTION, SUSPENSION INTRA-ARTICULAR; INTRAMUSCULAR at 15:55

## 2023-02-14 ENCOUNTER — OFFICE VISIT (OUTPATIENT)
Dept: FAMILY MEDICINE CLINIC | Facility: CLINIC | Age: 73
End: 2023-02-14

## 2023-02-14 VITALS
WEIGHT: 200 LBS | RESPIRATION RATE: 16 BRPM | HEIGHT: 66 IN | BODY MASS INDEX: 32.14 KG/M2 | OXYGEN SATURATION: 98 % | TEMPERATURE: 98.1 F | SYSTOLIC BLOOD PRESSURE: 120 MMHG | HEART RATE: 62 BPM | DIASTOLIC BLOOD PRESSURE: 70 MMHG

## 2023-02-14 DIAGNOSIS — M25.511 CHRONIC RIGHT SHOULDER PAIN: ICD-10-CM

## 2023-02-14 DIAGNOSIS — G89.29 CHRONIC RIGHT SHOULDER PAIN: ICD-10-CM

## 2023-02-14 DIAGNOSIS — R06.02 SHORTNESS OF BREATH: Primary | ICD-10-CM

## 2023-02-14 NOTE — PROGRESS NOTES
Subjective:  Chief Complaint   Patient presents with   • Right Shoulder - Pain       Devan Randolph is a 68 y o  female complains of right shoulder pain  Onset of the symptoms was several weeks ago  Mechanism of injury: none  Aggravating factors: overhead   Treatment to date: avoidance of offending activity and rest  Symptoms have gradually worsened  The following portions of the patient's history were reviewed and updated as appropriate: allergies, current medications, past family history, past medical history, past social history, past surgical history and problem list     Occupation:      Review of Systems   Constitutional: Negative for fever  HENT: Negative for dental problem and headaches  Eyes: Negative for vision loss  Respiratory: Negative for cough and shortness of breath  Cardiovascular: Negative for leg swelling and palpitations  Gastrointestinal: Negative for constipation and diarrhea  Genitourinary: Negative for bladder incontinence and difficulty urinating  Musculoskeletal: Negative for back pain and difficulty walking  Skin: Negative for rash and ulcer  Neurological: Negative for dizziness and headaches  Hem/Lymph/Immuno: Negative for blood clots  Does not bruise/bleed easily  Psychiatric/Behavioral: Negative for confusion  Objective:  /62   Pulse 56   Resp 18   Ht 5' 6" (1 676 m)   Wt 91 2 kg (201 lb)   SpO2 (!) 83%   BMI 32 44 kg/m²     Skin: no rashes, lesions, skin discolorations, lacerations  Vasculature: normal radial and ulnar pulse,  normal skin color, normal capillary refill in extremity, no upper extremity edema  Neurologic: Neurologic exam is normal throughout upper extremities, Awake, alert, and oriented x3, no apparent distress  Musculoskeletal:   right SHOULDER EXAM    Intact skin    No erythema, no induration, no signs of infection  No gross deformity    AROM FF: 180 degrees  AROM ER with arm at its side: 90 degrees  AROM IR: 80 degrees    Grind test: negative    Supraspinatus strength testin/5  Infraspinatus strength testin/5    Belly press: negative  Bear Hug test: negative    Empty can: positive  Biceps TTP: positive  Arc test: positive    [place]'s test:negative  Scapular posturing: good    Tenderness to palpation of AC joint: negative  Pain with cross-body adduction: negative    Anterior glide: negative  Posterior load and shift: negative  Sulcus sign: negative        Imaging:    XR chest pa & lateral    Result Date: 2023  Narrative: CHEST INDICATION:   R50 9: Fever, unspecified R06 02: Shortness of breath  Shortness of breath since Covid  COMPARISON:  CXR 2021 and chest CT 2009  EXAM PERFORMED/VIEWS:  XR CHEST PA & LATERAL FINDINGS: Cardiomediastinal silhouette appears unremarkable  The lungs are clear  No pneumothorax or pleural effusion  Osseous structures appear within normal limits for patient age  Impression: No acute cardiopulmonary disease  Workstation performed: [id number]        Assessment/Plan:  1  Subacromial impingement of right shoulder    > 35 min devoted to review of previous, pertinent medical records, imaging, discussion of treatment options, counseling and documentation  We discussed the nature of subacromial impingement at length and detailed the treatment approach  Directed to ice the area daily for 20 minutes at a time using a barrier to protect the skin- stressed specifically icing after activity to address inflammation  Recommending formal PT- Rx provided for PT  We discussed a HEP and literature was provided for reference  It was explained that this does not supplement formal PT but should serve to bridge the gap, while they wait to get into PT  Advised to avoid any exercises which exacerbate their pain  Follow up in 8 weeks  Should sx's worsen or any concerns arise, they were advised to follow up sooner or seek more immediate medical attention    All of the patient's concerns were addressed and questions answered  They verbalized agreement with and understanding of the treatment plan  - Ambulatory Referral to Physical Therapy;  Future  - Large joint arthrocentesis: R subacromial bursa

## 2023-02-14 NOTE — PROGRESS NOTES
Name: Bibiana Blanco      : 1950      MRN: [id number]  Encounter Provider: Reji Cotto MD  Encounter Date: 2023   Encounter department: [address] [place] [address]     1  Shortness of breath  -     CT chest wo contrast; Future; Expected date: 2023    2  Chronic right shoulder pain         Obtain CT scan of the chest for evaluation of persistent shortness of breath after COVID-19, discussed patient's most recent findings including chest x-ray, no significant pleural effusion, pulmonary edema, no evidence of consolidation indicating pneumonia  Patient may discontinue Bactrim at this time  Follow-up for annual physical, 30 minutes and to discuss CT scan result    Subjective      HPI     59-year-old female patient presents for evaluation of persistent shortness of breath  Patient was last seen on 2023 for fever, shortness of breath, increased urinary frequency as well as chronic right shoulder pain  X-ray of the chest was completed, no significant abnormal finding at that time  Patient reports her UTI is improving after starting Bactrim  Urine culture was negative for specific organism  Patient reports she did have low-grade fever, 100 degrees yesterday  There is concerning finding of SPO2 of 83 yesterday orthopedics department however this could have been an error  Patient reports she went home and took her pulse ox and it was 96 to 97% and today in the office she was 98%  Patient reports shortness with exertion  Patient denies any sedentary activity  She reports she has had shortness of breath problem on and off after COVID-19 diagnosis back in beginning of last year  Patient reports possible worsening shortness of breath with Bactrim, has been on this medication since last Wednesday UTI symptoms resolved    Review of Systems   Constitutional: Positive for fever  Negative for chills  HENT: Negative for congestion, rhinorrhea and sore throat  Phlegm in the back of his throat in the morning   Respiratory: Positive for shortness of breath (Mild at baseline, worsened with activity)  Cardiovascular: Negative for chest pain  Gastrointestinal: Negative for abdominal pain  Musculoskeletal: Positive for arthralgias (right shoulder pain)  Neurological: Negative for headaches  Current Outpatient Medications on File Prior to Visit   Medication Sig   • Ascorbic Acid (VITAMIN C) 500 MG CAPS Take by mouth   • aspirin 81 mg chewable tablet Chew 1 tablet daily   • Blood Glucose Monitoring Suppl ([address]) w/Device KIT by Does not apply route daily   • metFORMIN (GLUCOPHAGE) 500 mg tablet Take 1 tablet (500 mg total) by mouth 2 (two) times a day with meals   • Omega-3 Fatty Acids (FISH OIL) 1,000 mg Take 1,000 mg by mouth daily   • ONE TOUCH CLUB LANCETS MISC by Does not apply route daily   • ONE TOUCH ULTRA TEST test strip USE AS INSTRUCTED   • oxybutynin (DITROPAN) 5 mg tablet TAKE 1 TABLET BY MOUTH TWICE A DAY   • sulfamethoxazole-trimethoprim (BACTRIM DS) 800-160 mg per tablet Take 1 tablet by mouth every 12 (twelve) hours for 10 days   • benzonatate (TESSALON PERLES) 100 mg capsule Take 1 capsule (100 mg total) by mouth 3 (three) times a day as needed for cough (Patient not taking: Reported on 2/14/2023)   • meclizine (ANTIVERT) 25 mg tablet Take 1 tablet (25 mg total) by mouth every 8 (eight) hours as needed for dizziness (Patient not taking: Reported on 2/14/2023)   • mupirocin (BACTROBAN) 2 % ointment Apply topically 2 (two) times a day       Objective     /70 (BP Location: Left arm, Patient Position: Sitting, Cuff Size: Large)   Pulse 62   Temp 98 1 °F (36 7 °C) (Temporal)   Resp 16   Ht 5' 6" (1 676 m)   Wt 90 7 kg (200 lb)   SpO2 98%   BMI 32 28 kg/m²     Physical Exam  Vitals reviewed  Constitutional:       General: She is not in acute distress  Appearance: Normal appearance  She is obese   She is not ill-appearing, toxic-appearing or diaphoretic  Cardiovascular:      Rate and Rhythm: Normal rate and regular rhythm  Pulses: Normal pulses  Heart sounds: Normal heart sounds  No murmur heard  Pulmonary:      Effort: Pulmonary effort is normal    Abdominal:      General: Abdomen is flat  Musculoskeletal:         General: Tenderness present  No swelling  Comments: Tenderness on the anterior shoulder on the right   Skin:     General: Skin is warm and dry  Capillary Refill: Capillary refill takes less than 2 seconds  Coloration: Skin is not jaundiced  Neurological:      General: No focal deficit present  Mental Status: She is alert and oriented to person, place, and time     Psychiatric:         Mood and Affect: Mood normal               Hudson Gunderson MD

## 2023-02-14 NOTE — LETTER
February 14, 2023     Patient: Sunni Small  YOB: 1950  Date of Visit: 2/14/2023      To Whom it May Concern:    Sunni Small is under my professional care  Nidia Jackson was seen in my office on 2/14/2023  Nidia Jackson may return to work on 2/15/23  If you have any questions or concerns, please don't hesitate to call           Sincerely,          Barbara Chacko MD        CC: No Recipients

## 2023-02-16 RX ORDER — BUPIVACAINE HYDROCHLORIDE 2.5 MG/ML
4 INJECTION, SOLUTION INFILTRATION; PERINEURAL
Status: COMPLETED | OUTPATIENT
Start: 2023-02-13 | End: 2023-02-13

## 2023-02-16 RX ORDER — TRIAMCINOLONE ACETONIDE 40 MG/ML
40 INJECTION, SUSPENSION INTRA-ARTICULAR; INTRAMUSCULAR
Status: COMPLETED | OUTPATIENT
Start: 2023-02-13 | End: 2023-02-13

## 2023-02-24 ENCOUNTER — EVALUATION (OUTPATIENT)
Dept: PHYSICAL THERAPY | Facility: CLINIC | Age: 73
End: 2023-02-24

## 2023-02-24 DIAGNOSIS — M75.41 SUBACROMIAL IMPINGEMENT OF RIGHT SHOULDER: ICD-10-CM

## 2023-02-24 NOTE — PROGRESS NOTES
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  Pt does not present with any red flag symptoms at this time  Signs and symptoms consistent with this include reduced shld ROM, pain with AROM, reduced cervical ROM, and positive adverse neural dynamics which makes it difficult for her to tolerate work requirements and ADLs  Educated on POC and HEP, pt verbalized understanding  Pt would benefit from skilled physical therapy in order to decrease deficits and return to prior level of function  Impairments: abnormal gait, abnormal or restricted ROM, activity intolerance, impaired physical strength and pain with function    Symptom irritability: moderateUnderstanding of Dx/Px/POC: good   Prognosis: good  Prognosis details: Positive Prognostic Factors: Positive attitude toward therapy    Negative Prognostic Factors: chronicity of symptoms    Goals  STG (4 weeks):  Pt will be independent with HEP  Pt will demonstrate increase in cervical R ROT ROM by 5-10d  LTG (8 weeks): FOTO will be expected outcome  Pt will demonstrate painfree shoulder ROM comparable to contralateral limb  Pt will demonstrate painfree cervical motion         Plan  Patient would benefit from: skilled physical therapy  Planned modality interventions: cryotherapy  Planned therapy interventions: manual therapy, neuromuscular re-education, patient education, self care, strengthening, stretching, therapeutic activities, therapeutic exercise and home exercise program  Frequency: 2x week  Duration in weeks: 6  Treatment plan discussed with: patient        Subjective Evaluation    History of Present Illness  Mechanism of injury: Chief Complaint: Pt [place] the airport and [place], her check bag was rolling faster than her and she tried stopping it from rolling  This twisted her arm back and forth and she reports having immediate pain  She states that it did not improve and therefore she went to the MD  She was given a cortisone injection on 2/13/23  She reports almost 75% improvement since this injection  She states that now she will still have some pain but it is continuing to improve  24 hour Pattern: worst in the morning because she is only able to sleep on her R side and this position aggravates the shoulder  Severity: Low  Irritability: moderate  Nature: nociceptive/peripheral neuropathic  Stage: subacute  Stability: progressing    P1: R sup/ant/lat shld, dull, 1-0-1  Aggs/eases: horizontal abduction reaching back, reaching overhead, don/doff clothing; heat, NSAIDs    P2: R sided neck pain  Aggs/eases: neck movement    What do you think you need to do to get better?: stretching    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, but she only feels it by the end of the day    GHS: UTI, took herself off of bactrim, none  Patient Goals  Patient goal: no pain, return to 100%        Objective     Observations     Additional Observation Details  Downward rotation of the scapula and bra straps over the superior shld    Cervical/Thoracic Screen   Cervical range of motion within normal limits with the following exceptions: Reduced R SB with increased neck pain and R ROT 50d (L ROT 65d)  Pain with L to R sideglide with all jts gd 3    Active Range of Motion     Additional Active Range of Motion Details  Slight reduction in flexion/abduction/ER, same with all other motions but pain and tightness with BTB    Strength/Myotome Testing     Additional Strength Details  No pain with any MMT testing although hesitant to fully test today due to continued improvement    Tests     Additional Tests Details  ULNTA (+) on R             Precautions: DM       Manuals 2/24            Prone CPA/UPA             CHELA sliders NATHANIEL                                      Neuro Re-Ed             TB ext             TB row                                                                 Pt edu NATHANIEL            Ther Ex                                                                                                        UBE             Ther Activity                                       Gait Training                                       Modalities

## 2023-02-27 ENCOUNTER — OFFICE VISIT (OUTPATIENT)
Dept: PHYSICAL THERAPY | Facility: CLINIC | Age: 73
End: 2023-02-27

## 2023-02-27 DIAGNOSIS — M75.41 SUBACROMIAL IMPINGEMENT OF RIGHT SHOULDER: Primary | ICD-10-CM

## 2023-02-27 NOTE — PROGRESS NOTES
Daily Note     Today's date: 2023  Patient name: Nahun Vazquez  : 1950  MRN: [id number]  Referring provider: Evette Mcfadden DO  Dx:   Encounter Diagnosis     ICD-10-CM    1  Subacromial impingement of right shoulder  M75 41                      Subjective: Pt reports her neck pain       Objective: See treatment diary below      Assessment: Tolerated treatment well  Cervical R ROT improved from 40d to 70d following MT  Followed up with cervical towel assisted rotation  Updated HEP  Pt reported no resting pain at the end of treatment today  Will look further at shoulder in coming visits as neck resolves  Patient would benefit from continued PT      Plan: Continue per plan of care  Progress treatment as tolerated         Precautions: DM       Manuals            Prone CPA/UPA  NATHANIEL gd 3 L UPA C3-4 1', CPA T1-2 gd 3-4 2x30"           CHELA ARMSTRONG            Assessment of jt mobility  NATHANIEL                        Neuro Re-Ed             TB ext             TB row             Cervical SNAG rotation  NATHANIEL HEP                                                  Pt edu NATHANIEL            Ther Ex                                                                                                        UBE             Ther Activity                                       Gait Training                                       Modalities

## 2023-03-24 ENCOUNTER — HOSPITAL ENCOUNTER (OUTPATIENT)
Dept: CT IMAGING | Facility: HOSPITAL | Age: 73
End: 2023-03-24
Attending: FAMILY MEDICINE

## 2023-03-24 ENCOUNTER — HOSPITAL ENCOUNTER (OUTPATIENT)
Dept: NON INVASIVE DIAGNOSTICS | Facility: HOSPITAL | Age: 73
Discharge: HOME | End: 2023-03-24
Attending: FAMILY MEDICINE

## 2023-03-24 VITALS
BODY MASS INDEX: 32.14 KG/M2 | HEIGHT: 66 IN | SYSTOLIC BLOOD PRESSURE: 120 MMHG | WEIGHT: 200 LBS | HEART RATE: 71 BPM | DIASTOLIC BLOOD PRESSURE: 70 MMHG

## 2023-03-24 DIAGNOSIS — R06.02 SHORTNESS OF BREATH: ICD-10-CM

## 2023-03-24 LAB
AORTIC ROOT: 2.7 CM
APICAL FOUR CHAMBER EJECTION FRACTION: 55 %
ASCENDING AORTA: 3.1 CM
E WAVE DECELERATION TIME: 261 MS
FRACTIONAL SHORTENING: 29 % (ref 28–44)
INTERVENTRICULAR SEPTUM IN DIASTOLE (PARASTERNAL SHORT AXIS VIEW): 0.9 CM
INTERVENTRICULAR SEPTUM: 0.9 CM (ref 0.6–1.1)
LA/AORTA RATIO 2D: 1.07
LAAS-AP2: 11.5 CM2
LAAS-AP4: 10.4 CM2
LEFT ATRIUM SIZE: 2.9 CM
LEFT INTERNAL DIMENSION IN SYSTOLE: 2.9 CM (ref 2.1–4)
LEFT VENTRICLE DIASTOLIC VOLUME (MOD BIPLANE): 79 ML
LEFT VENTRICLE SYSTOLIC VOLUME (MOD BIPLANE): 35 ML
LEFT VENTRICULAR INTERNAL DIMENSION IN DIASTOLE: 4.1 CM (ref 3.5–6)
LEFT VENTRICULAR POSTERIOR WALL IN END DIASTOLE: 0.9 CM
LEFT VENTRICULAR STROKE VOLUME: 42 ML
LV EF: 56 %
LVSV (TEICH): 42 ML
MV E'TISSUE VEL-SEP: 7 CM/S
MV PEAK A VEL: 0.74 M/S
MV PEAK E VEL: 62 CM/S
MV STENOSIS PRESSURE HALF TIME: 76 MS
MV VALVE AREA P 1/2 METHOD: 2.89 CM2
SL CV LV EF: 55
SL CV PED ECHO LEFT VENTRICLE DIASTOLIC VOLUME (MOD BIPLANE) 2D: 74 ML
SL CV PED ECHO LEFT VENTRICLE SYSTOLIC VOLUME (MOD BIPLANE) 2D: 32 ML
TR MAX PG: 18 MMHG
TR PEAK VELOCITY: 2.1 M/S
TRICUSPID ANNULAR PLANE SYSTOLIC EXCURSION: 1.9 CM
TRICUSPID VALVE PEAK REGURGITATION VELOCITY: 2.14 M/S

## 2023-03-27 ENCOUNTER — OFFICE VISIT (OUTPATIENT)
Dept: FAMILY MEDICINE CLINIC | Facility: CLINIC | Age: 73
End: 2023-03-27

## 2023-03-27 VITALS
HEART RATE: 80 BPM | DIASTOLIC BLOOD PRESSURE: 72 MMHG | HEIGHT: 66 IN | BODY MASS INDEX: 33.43 KG/M2 | SYSTOLIC BLOOD PRESSURE: 112 MMHG | WEIGHT: 208 LBS | TEMPERATURE: 97.6 F | OXYGEN SATURATION: 96 %

## 2023-03-27 DIAGNOSIS — Z12.12 ENCOUNTER FOR COLORECTAL CANCER SCREENING: ICD-10-CM

## 2023-03-27 DIAGNOSIS — E11.9 TYPE 2 DIABETES MELLITUS WITHOUT COMPLICATION, WITHOUT LONG-TERM CURRENT USE OF INSULIN (HCC): ICD-10-CM

## 2023-03-27 DIAGNOSIS — Z12.11 ENCOUNTER FOR COLORECTAL CANCER SCREENING: ICD-10-CM

## 2023-03-27 DIAGNOSIS — Z00.00 ANNUAL PHYSICAL EXAM: ICD-10-CM

## 2023-03-27 DIAGNOSIS — Z12.31 ENCOUNTER FOR SCREENING MAMMOGRAM FOR BREAST CANCER: Primary | ICD-10-CM

## 2023-03-27 DIAGNOSIS — Z80.49 FAMILY HISTORY OF CERVICAL CANCER: ICD-10-CM

## 2023-03-27 NOTE — PROGRESS NOTES
[address]    NAME: Pleas Goltz  AGE: 68 y o  SEX: female  : 1950     DATE: 3/27/2023     Assessment and Plan:     Problem List Items Addressed This Visit        Endocrine    Type 2 diabetes mellitus without complication, without long-term current use of insulin ([facility] )    Relevant Orders    Microalbumin / creatinine urine ratio   Other Visit Diagnoses     Encounter for screening mammogram for breast cancer    -  Primary    Relevant Orders    Mammo screening bilateral w 3d & cad    Annual physical exam        Encounter for colorectal cancer screening        Relevant Orders    Ambulatory referral for colonoscopy        Please continue exercise for your shoulder pain, please obtain updated blood work, return in 2 months for your diabetes follow-up  May use metformin 500 mg once a day rather than twice a day  Follow-up colonoscopy    Immunizations and preventive care screenings were discussed with patient today  Appropriate education was printed on patient's after visit summary  Counseling:  Alcohol/drug use: discussed moderation in alcohol intake, the recommendations for healthy alcohol use, and avoidance of illicit drug use  Dental Health: discussed importance of regular tooth brushing, flossing, and dental visits  Injury prevention: discussed safety/seat belts, safety helmets, smoke detectors, carbon dioxide detectors, and smoking near bedding or upholstery  Sexual health: discussed sexually transmitted diseases, partner selection, use of condoms, avoidance of unintended pregnancy, and contraceptive alternatives  · Exercise: the importance of regular exercise/physical activity was discussed  Recommend exercise 3-5 times per week for at least 30 minutes  No follow-ups on file       Chief Complaint:     Chief Complaint   Patient presents with   • Annual Exam     Annual Exam       History of Present Illness:     Adult Annual Physical   Patient here for a comprehensive physical exam  The patient reports no problems  Reviewed patient's most recent echocardiogram    Diet and Physical Activity  · Diet/Nutrition: well balanced diet and consuming 3-5 servings of fruits/vegetables daily  · Exercise: walking  Depression Screening  PHQ-2/9 Depression Screening    Little interest or pleasure in doing things: 0 - not at all  Feeling down, depressed, or hopeless: 0 - not at all  PHQ-2 Score: 0  PHQ-2 Interpretation: Negative depression screen       General Health  · Sleep: sleeps well  · Hearing: normal - bilateral   · Vision: no vision problems and wears glasses  Has to call and make an appointment for cataract surgery  · Dental: regular dental visits and brushes teeth twice daily  /GYN Health  · Patient is: postmenopausal  · Last menstrual period: n/a  · Contraceptive method: n/a  Review of Systems:     Review of Systems   Constitutional: Negative for appetite change, chills and fever  HENT: Negative for congestion, rhinorrhea and sore throat  Respiratory: Negative for shortness of breath  Cardiovascular: Negative for chest pain  Gastrointestinal: Negative for abdominal pain, blood in stool, constipation, diarrhea, nausea and vomiting  Genitourinary: Negative for dysuria and hematuria  Musculoskeletal: Positive for arthralgias (Mild knee pain) and gait problem  Negative for back pain  Neurological: Negative for dizziness, light-headedness and headaches  Psychiatric/Behavioral: Negative for sleep disturbance  Past Medical History:     Past Medical History:   Diagnosis Date   • 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[address] )    • Skin disorder    • Vascular disorder       Past Surgical History:     No past surgical history on file     Social History:     Social History     Socioeconomic History   • Marital status: /Civil Union     Spouse name: None   • Number of children: None   • Years of education: None   • Highest education level: None   Occupational History   • None   Tobacco Use   • Smoking status: Never   • Smokeless tobacco: Never   Vaping Use   • Vaping Use: Never used   Substance and Sexual Activity   • Alcohol use: No   • Drug use: No   • Sexual activity: None   Other Topics Concern   • None   Social History Narrative   • None     Social Determinants of Health     Financial Resource Strain: Not on file   Food Insecurity: Not on file   Transportation Needs: Not on file   Physical Activity: Not on file   Stress: Not on file   Social Connections: Not on file   Intimate Partner Violence: Not on file   Housing Stability: Not on file      Family History:     Family History   Problem Relation Age of Onset   • Diabetes Mother    • Hypertension Mother    • Ovarian cancer Mother [de-identified]   • Breast cancer Sister 61   • Breast cancer Maternal Aunt 48   • No Known Problems Father    • No Known Problems Maternal Grandmother    • No Known Problems Maternal Grandfather    • No Known Problems Paternal Grandmother    • No Known Problems Paternal Grandfather    • Prostate cancer Neg Hx 60   • BRCA2 Positive Neg Hx    • BRCA1 Positive Neg Hx    • BRCA2 Negative Neg Hx    • BRCA 1/2 Neg Hx    • BRCA1 Negative Neg Hx    • Endometrial cancer Neg Hx    • Breast cancer additional onset Neg Hx    • Colon cancer Neg Hx       Current Medications:     Current Outpatient Medications   Medication Sig Dispense Refill   • Ascorbic Acid (VITAMIN C) 500 MG CAPS Take by mouth     • aspirin 81 mg chewable tablet Chew 1 tablet daily     • Blood Glucose Monitoring Suppl ([address]) w/Device KIT by Does not apply route daily 1 each 0   • metFORMIN (GLUCOPHAGE) 500 mg tablet Take 1 tablet (500 mg total) by mouth 2 (two) times a day with meals 180 tablet 0   • Omega-3 Fatty Acids (FISH OIL) 1,000 mg Take 1,000 mg by mouth daily     • ONE TOUCH CLUB LANCETS MISC by Does not apply route daily 100 each 1   • ONE TOUCH ULTRA TEST test strip USE AS "INSTRUCTED 100 each 1   • oxybutynin (DITROPAN) 5 mg tablet TAKE 1 TABLET BY MOUTH TWICE A  tablet 1   • benzonatate (TESSALON PERLES) 100 mg capsule Take 1 capsule (100 mg total) by mouth 3 (three) times a day as needed for cough (Patient not taking: Reported on 2/14/2023) 20 capsule 0   • meclizine (ANTIVERT) 25 mg tablet Take 1 tablet (25 mg total) by mouth every 8 (eight) hours as needed for dizziness (Patient not taking: Reported on 2/14/2023) 30 tablet 0   • mupirocin (BACTROBAN) 2 % ointment Apply topically 2 (two) times a day (Patient not taking: Reported on 3/27/2023) 22 g 0     No current facility-administered medications for this visit  Allergies:     No Known Allergies   Physical Exam:     /72 (BP Location: Left arm, Patient Position: Sitting, Cuff Size: Standard)   Pulse 80   Temp 97 6 °F (36 4 °C)   Ht 5' 6\" (1 676 m)   Wt 94 3 kg (208 lb)   SpO2 96%   BMI 33 57 kg/m²     Physical Exam  Vitals reviewed  Constitutional:       General: She is not in acute distress  Appearance: Normal appearance  She is obese  She is not ill-appearing, toxic-appearing or diaphoretic  Cardiovascular:      Rate and Rhythm: Normal rate and regular rhythm  Pulses: Normal pulses  Heart sounds: Normal heart sounds  Pulmonary:      Effort: Pulmonary effort is normal  No respiratory distress  Breath sounds: Normal breath sounds  Abdominal:      General: Abdomen is flat  Musculoskeletal:      Left lower leg: Edema present  Comments: Left lower extremity slightly swollen compared to the right, chronic issue secondary to history of DVT   Skin:     General: Skin is warm and dry  Capillary Refill: Capillary refill takes less than 2 seconds  Coloration: Skin is not jaundiced  Neurological:      General: No focal deficit present  Mental Status: She is alert and oriented to person, place, and time     Psychiatric:         Mood and Affect: Mood normal          " MD Gorge Dover

## 2023-03-28 LAB
CREAT UR-MCNC: 64.3 MG/DL
MICROALBUMIN UR-MCNC: 7.8 MG/L (ref 0–20)
MICROALBUMIN/CREAT 24H UR: 12 MG/G CREATININE (ref 0–30)

## 2023-04-09 PROBLEM — R50.9 FEVER: Status: RESOLVED | Noted: 2023-02-08 | Resolved: 2023-04-09

## 2023-05-01 ENCOUNTER — OFFICE VISIT (OUTPATIENT)
Age: 73
End: 2023-05-01

## 2023-05-01 VITALS
WEIGHT: 209 LBS | BODY MASS INDEX: 33.59 KG/M2 | DIASTOLIC BLOOD PRESSURE: 78 MMHG | HEIGHT: 66 IN | SYSTOLIC BLOOD PRESSURE: 138 MMHG

## 2023-05-01 DIAGNOSIS — R35.0 URINE FREQUENCY: ICD-10-CM

## 2023-05-01 DIAGNOSIS — Z80.49 FAMILY HISTORY OF CERVICAL CANCER: ICD-10-CM

## 2023-05-01 DIAGNOSIS — Z01.419 ENCOUNTER FOR ANNUAL ROUTINE GYNECOLOGICAL EXAMINATION: Primary | ICD-10-CM

## 2023-05-01 PROBLEM — M26.629 TMJ SYNDROME: Status: ACTIVE | Noted: 2017-03-09

## 2023-05-01 NOTE — ASSESSMENT & PLAN NOTE
ASCCP guidelines reviewed  Pap/HPV not indicated  SBE encouraged  CBE and mammograms annually  Perineal hygiene reviewed  Daily exercise and healthy diet with adequate calcium and vitamin D encouraged  Weight bearing exercises a minimum of 150 minutes/week advised  Advised to call with any issues, all concerns & questions addressed     See provided information in your after visit summary

## 2023-05-01 NOTE — PATIENT INSTRUCTIONS
Breast Self Exam for Women   AMBULATORY CARE:   A breast self-exam (BSE)  is a way to check your breasts for lumps and other changes  Regular BSEs can help you know how your breasts normally look and feel  Most breast lumps or changes are not cancer, but you should always have them checked by a healthcare provider  Why you should do a BSE:  Breast cancer is the most common type of cancer in women  Even if you have mammograms, you may still want to do a BSE regularly  If you know how your breasts normally feel and look, it may help you know when to contact your healthcare provider  Mammograms can miss some cancers  You may find a lump during a BSE that did not show up on a mammogram   When you should do a BSE:  If you have periods, you may want to do your BSE 1 week after your period ends  This is the time when your breasts may be the least swollen, lumpy, or tender  You can do regular BSEs even if you are breastfeeding or have breast implants  Call your doctor if:   You find any lumps or changes in your breasts  You have breast pain or fluid coming from your nipples  You have questions or concerns about your condition or care  How to do a BSE:       Look at your breasts in a mirror  Look at the size and shape of each breast and nipple  Check for swelling, lumps, dimpling, scaly skin, or other skin changes  Look for nipple changes, such as a nipple that is painful or beginning to pull inward  Gently squeeze both nipples and check to see if fluid (that is not breast milk) comes out of them  If you find any of these or other breast changes, contact your healthcare provider  Check your breasts while you sit or  the following 3 positions:    [place] your arms down at your sides  Raise your hands and join them behind your head  Put firm pressure with your hands on your hips  Bend slightly forward while you look at your breasts in the mirror  Lie down and feel your breasts    When you lie down, your breast tissue spreads out evenly over your chest  This makes it easier for you to feel for lumps and anything that may not be normal for your breasts  Do a BSE on one breast at a time  Place a small pillow or towel under your left shoulder  Put your left arm behind your head  Use the 3 middle fingers of your right hand  Use your fingertip pads, on the top of your fingers  Your fingertip pad is the most sensitive part of your finger  Use small circles to feel your breast tissue  Use your fingertip pads to make dime-sized, overlapping circles on your breast and armpits  Use light, medium, and firm pressure  First, press lightly  Second, press with medium pressure to feel a little deeper into the breast  Last, use firm pressure to feel deep within your breast     Examine your entire breast area  Examine the breast area from above the breast to below the breast where you feel only ribs  Make small circles with your fingertips, starting in the middle of your armpit  Make circles going up and down the breast area  Continue toward your breast and all the way across it  Examine the area from your armpit all the way over to the middle of your chest (breastbone)  Stop at the middle of your chest     Move the pillow or towel to your right shoulder, and put your right arm behind your head  Use the 3 fingertip pads of your left hand, and repeat the above steps to do a BSE on your right breast   What else you can do to check for breast problems or cancer:  Talk to your healthcare provider about mammograms  A mammogram is an x-ray of your breasts to screen for breast cancer or other problems  Your provider can tell you the benefits and risks of mammograms  The first mammogram is usually at age 39 or 48  Your provider may recommend you start at 36 or younger if your risk for breast cancer is high  Mammograms usually continue every 1 to 2 years until age 76         Follow up with your doctor as directed:  Write down your questions so you remember to ask them during your visits  © [employer]illa 2022 Information is for End User's use only and may not be sold, redistributed or otherwise used for commercial purposes  The above information is an  only  It is not intended as medical advice for individual conditions or treatments  Talk to your doctor, nurse or pharmacist before following any medical regimen to see if it is safe and effective for you  Urinary Incontinence   WHAT YOU NEED TO KNOW:   What is urinary incontinence (UI)? Urinary incontinence (UI) is when you lose control of your bladder  UI develops because your bladder cannot store or empty urine properly  The 3 most common types of UI are stress incontinence, urge incontinence, or both  What are the signs and symptoms of UI? You feel like your bladder does not empty completely when you urinate  You urinate often and need to urinate immediately  You leak urine when you sleep, or you wake up with the urge to urinate  You leak urine when you cough, sneeze, exercise, or laugh  How is UI treated? Medicines  can help strengthen your bladder control  Electrical stimulation  is used to send a small amount of electrical energy to your pelvic floor muscles  This helps control your bladder function  Electrodes may be placed outside your body or in your rectum  For women, the electrodes may be placed in the vagina  A bulking agent  may be injected into the wall of your urethra to make it thicker  This helps keep your urethra closed and decreases urine leakage  Devices  such as a clamp, pessary, or tampon may help stop urine leaks  Ask your healthcare provider for more information about these and other devices  Surgery  may be needed if other treatments do not work  Several types of surgery can help improve your bladder control  Ask your healthcare provider for more information about the surgery you may need      How can I manage my symptoms? Do pelvic muscle exercises often  Your pelvic muscles help you stop urinating  Squeeze these muscles tight for 5 seconds, then relax for 5 seconds  Gradually work up to squeezing for 10 seconds  Do 3 sets of 15 repetitions a day, or as directed  This will help strengthen your pelvic muscles and improve bladder control  Keep a UI record  Write down how often you leak urine and how much you leak  Make a note of what you were doing when you leaked urine  Train your bladder  Go to the bathroom at set times, such as every 2 hours, even if you do not feel the urge to go  You can also try to hold your urine when you feel the urge to go  For example, hold your urine for 5 minutes when you feel the urge to go  As that becomes easier, hold your urine for 10 minutes  Drink liquids as directed  Ask your healthcare provider how much liquid to drink each day and which liquids are best for you  You may need to limit the amount of liquid you drink to help control your urine leakage  Do not drink any liquid right before you go to bed  Limit or do not have drinks that contain caffeine or alcohol  Prevent constipation  Eat a variety of high-fiber foods  Good examples are high-fiber cereals, beans, vegetables, and whole-grain breads  Prune juice may help make your bowel movement softer  Walking is the best way to trigger your intestines to have a bowel movement  Exercise regularly and maintain a healthy weight  Ask your healthcare provider how much you should weigh and about the best exercise plan for you  Weight loss and exercise will decrease pressure on your bladder and help you control your leakage  Ask him or her to help you create a weight loss plan if you are overweight  Use a catheter as directed  to help empty your bladder  A catheter is a tiny, plastic tube that is put into your bladder to drain your urine   Your healthcare provider may tell you to use a catheter to prevent your bladder from getting too full and leaking urine  Go to behavior therapy as directed  Behavior therapy may be used to help you learn to control your urge to urinate  When should I call my doctor? You have severe pain  You are confused or cannot think clearly  You have a fever  You see blood in your urine  You have pain when you urinate  You have new or worse pain, even after treatment  Your mouth feels dry or you have vision changes  Your urine is cloudy or smells bad  You have questions or concerns about your condition or care  CARE AGREEMENT:   You have the right to help plan your care  Learn about your health condition and how it may be treated  Discuss treatment options with your healthcare providers to decide what care you want to receive  You always have the right to refuse treatment  The above information is an  only  It is not intended as medical advice for individual conditions or treatments  Talk to your doctor, nurse or pharmacist before following any medical regimen to see if it is safe and effective for you  © Copyright Nelda Yariel 2022 Information is for End User's use only and may not be sold, redistributed or otherwise used for commercial purposes

## 2023-05-01 NOTE — PROGRESS NOTES
Eneida Petersen  1950    Assessment/Plan: Yearly exam     Encounter for annual routine gynecological examination  ASCCP guidelines reviewed  Pap/HPV not indicated  SBE encouraged  CBE and mammograms annually  Perineal hygiene reviewed  Daily exercise and healthy diet with adequate calcium and vitamin D encouraged  Weight bearing exercises a minimum of 150 minutes/week advised  Advised to call with any issues, all concerns & questions addressed  See provided information in your after visit summary     Urine frequency  -h/o OAB on 5mg oxybutynin  -UTI last month, did not finish antibiotic treatment  Sx worse since infection  -repeat UA/UC  If negative, may consider increasing dose of oxybutynin  Pt states brand name Ditropan works better for her    -consider urology referral if symptoms do not improve  Manjinder Sauceda was seen today for gynecologic exam     Diagnoses and all orders for this visit:    Encounter for annual routine gynecological examination    Family history of cervical cancer  -     Ambulatory Referral to Obstetrics / Gynecology    Urine frequency  -     Urine culture  -     Urinalysis with microscopic      F/U annually or Biannual if Medicare     Health Maintenance:    Last Mammogram: 04/25/2022  BI-RADS 2  Next Mammogram: due - ordered by PCP  Last Colonoscopy: Not on file  - due, order placed by PCP  DXA Scan: 11/14/2018  Normal bone density, scheduled     Subjective    CC: Yearly Exam     Eneida Petersen is a 68 y o  postmenopausal female here for annual exam      Doyle Thompson  Denies PMB  Sexual activity: She is sexually active without pain, bleeding or dryness  STD testing:  She does not want STD testing today  Non- smoker, occasional drinker    H/o OAB currently taking oxybutynin 5 mg  States she was treated for a UTI last month but did not complete the antibiotics prescribed cause they gave her heart palpitations   Since then, her urinary incontinence and frequency had gotten worse  She denies breast concerns, vaginal issues, or pelvic pain today  Family hx of breast cancer: Yes Sister (64)  Family hx of ovarian cancer: Yes Mother [de-identified])  Family hx of colon cancer: No     Past Medical History:   Diagnosis Date    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[address] )     Skin disorder     Vascular disorder      No past surgical history on file     Family History   Problem Relation Age of Onset    Diabetes Mother     Hypertension Mother     Ovarian cancer Mother [de-identified]    Breast cancer Sister 61    Breast cancer Maternal Aunt 48    No Known Problems Father     No Known Problems Maternal Grandmother     No Known Problems Maternal Grandfather     No Known Problems Paternal Grandmother     No Known Problems Paternal Grandfather     Prostate cancer Neg Hx 61    BRCA2 Positive Neg Hx     BRCA1 Positive Neg Hx     BRCA2 Negative Neg Hx     BRCA 1/2 Neg Hx     BRCA1 Negative Neg Hx     Endometrial cancer Neg Hx     Breast cancer additional onset Neg Hx     Colon cancer Neg Hx      Social History     Tobacco Use    Smoking status: Never    Smokeless tobacco: Never   Vaping Use    Vaping Use: Never used   Substance Use Topics    Alcohol use: No    Drug use: No       Current Outpatient Medications:     Ascorbic Acid (VITAMIN C) 500 MG CAPS, Take by mouth, Disp: , Rfl:     aspirin 81 mg chewable tablet, Chew 1 tablet daily, Disp: , Rfl:     Blood Glucose Monitoring Suppl ([address]) w/Device KIT, by Does not apply route daily, Disp: 1 each, Rfl: 0    metFORMIN (GLUCOPHAGE) 500 mg tablet, Take 1 tablet (500 mg total) by mouth 2 (two) times a day with meals, Disp: 180 tablet, Rfl: 0    Omega-3 Fatty Acids (FISH OIL) 1,000 mg, Take 1,000 mg by mouth daily, Disp: , Rfl:     ONE TOUCH CLUB LANCETS MIS, by Does not apply route daily, Disp: 100 each, Rfl: 1    ONE TOUCH ULTRA TEST test strip, USE AS INSTRUCTED, Disp: 100 each, Rfl: 1    oxybutynin (DITROPAN) 5 mg tablet, TAKE 1 TABLET BY MOUTH TWICE A DAY, Disp: 180 tablet, Rfl: 1    benzonatate (TESSALON PERLES) 100 mg capsule, Take 1 capsule (100 mg total) by mouth 3 (three) times a day as needed for cough (Patient not taking: Reported on 2023), Disp: 20 capsule, Rfl: 0    meclizine (ANTIVERT) 25 mg tablet, Take 1 tablet (25 mg total) by mouth every 8 (eight) hours as needed for dizziness (Patient not taking: Reported on 2023), Disp: 30 tablet, Rfl: 0    mupirocin (BACTROBAN) 2 % ointment, Apply topically 2 (two) times a day (Patient not taking: Reported on 3/27/2023), Disp: 22 g, Rfl: 0  Patient Active Problem List    Diagnosis Date Noted    Encounter for annual routine gynecological examination 2023    Urine frequency 2023    Family history of cervical cancer 2023    Increased urinary frequency 2023    Chronic right shoulder pain 2023    Close exposure to COVID-19 virus 2022    Shortness of breath 2021    Overactive bladder 2021    Type 2 diabetes mellitus with stage 2 chronic kidney disease, without long-term current use of insulin ([facility] [address] ) 2021    Encounter for screening mammogram for malignant neoplasm of breast 2021    Candidiasis of breast 2018    Elevated liver enzymes 2018    Trigger middle finger of right hand 2018    Thyroid nodule 2017    Abnormal finding on thyroid function test 2017    TMJ syndrome 2017    Type 2 diabetes mellitus without complication, without long-term current use of insulin ([facility] [address] ) 2016    Arthritis 2012    Venous insufficiency 2012    Urinary incontinence 2012       No Known Allergies    OB History    Para Term  AB Living   2 1 0   1 0   SAB IAB Ectopic Multiple Live Births   1       0      # Outcome Date GA Lbr Kalen/2nd Weight Sex Delivery Anes PTL Lv   2 SAB            1 Para                Vitals:    23 0858   BP: 138/78   Weight: 94 8 "kg (209 lb)   Height: 5' 6\" (1 676 m)     Body mass index is 33 73 kg/m²  Review of Systems   Constitutional: Negative for chills and fever  Gastrointestinal: Negative for abdominal pain, constipation, diarrhea, nausea and vomiting  Genitourinary: Positive for frequency and urgency  Negative for difficulty urinating, dyspareunia, dysuria, hematuria, pelvic pain, vaginal bleeding, vaginal discharge and vaginal pain  Musculoskeletal: Negative for back pain and myalgias  Skin: Negative for pallor and rash  Neurological: Negative for dizziness, light-headedness and headaches  Hematological: Negative for adenopathy  Psychiatric/Behavioral: Negative for dysphoric mood  Physical Exam  Constitutional:       General: She is not in acute distress  Appearance: Normal appearance  She is not ill-appearing  Genitourinary:      No lesions in the vagina  Right Labia: No rash, tenderness, lesions or skin changes  Left Labia: No tenderness, lesions, skin changes or rash  No inguinal adenopathy present in the right or left side  No vaginal discharge, erythema, tenderness, bleeding or ulceration  No vaginal prolapse present  Mild vaginal atrophy present  Right Adnexa: not tender, not full and no mass present  Left Adnexa: not tender, not full and no mass present  Cervical nabothian cyst present  No cervical motion tenderness, discharge, friability, lesion or polyp  Uterus is not enlarged, fixed, tender or prolapsed  No uterine mass detected  Urethral meatus caruncle not present  No urethral prolapse, tenderness or discharge present  Bladder is not tender and urgency on palpation not present  Pelvic exam was performed with patient in the lithotomy position  Breasts:     Right: No swelling, inverted nipple, mass, nipple discharge, skin change or tenderness        Left: No swelling, inverted nipple, mass, nipple discharge, skin change " or tenderness  HENT:      Head: Normocephalic and atraumatic  Eyes:      Conjunctiva/sclera: Conjunctivae normal    Pulmonary:      Effort: Pulmonary effort is normal    Abdominal:      General: There is no distension  Palpations: Abdomen is soft  Tenderness: There is no abdominal tenderness  Musculoskeletal:         General: Normal range of motion  Cervical back: Neck supple  Lymphadenopathy:      Upper Body:      Right upper body: No supraclavicular or axillary adenopathy  Left upper body: No supraclavicular or axillary adenopathy  Lower Body: No right inguinal adenopathy  No left inguinal adenopathy  Neurological:      Mental Status: She is alert and oriented to person, place, and time  Skin:     General: Skin is warm and dry  Psychiatric:         Mood and Affect: Mood normal          Behavior: Behavior normal          Thought Content: Thought content normal    Vitals and nursing note reviewed

## 2023-05-01 NOTE — ASSESSMENT & PLAN NOTE
-h/o OAB on 5mg oxybutynin  -UTI last month, did not finish antibiotic treatment  Sx worse since infection  -repeat UA/UC  If negative, may consider increasing dose of oxybutynin  Pt states brand name Ditropan works better for her    -consider urology referral if symptoms do not improve

## 2023-05-02 LAB
BACTERIA UR QL AUTO: ABNORMAL /HPF
BILIRUB UR QL STRIP: NEGATIVE
CLARITY UR: CLEAR
COLOR UR: ABNORMAL
GLUCOSE UR STRIP-MCNC: NEGATIVE MG/DL
HGB UR QL STRIP.AUTO: NEGATIVE
KETONES UR STRIP-MCNC: NEGATIVE MG/DL
LEUKOCYTE ESTERASE UR QL STRIP: ABNORMAL
NITRITE UR QL STRIP: POSITIVE
NON-SQ EPI CELLS URNS QL MICRO: ABNORMAL /HPF
PH UR STRIP.AUTO: 6 [PH]
PROT UR STRIP-MCNC: NEGATIVE MG/DL
RBC #/AREA URNS AUTO: ABNORMAL /HPF
SP GR UR STRIP.AUTO: 1.01 (ref 1–1.03)
UROBILINOGEN UR STRIP-ACNC: <2 MG/DL
WBC #/AREA URNS AUTO: ABNORMAL /HPF

## 2023-05-04 LAB — BACTERIA UR CULT: ABNORMAL

## 2023-05-05 DIAGNOSIS — N30.00 ACUTE CYSTITIS WITHOUT HEMATURIA: Primary | ICD-10-CM

## 2023-05-05 RX ORDER — CEFDINIR 300 MG/1
300 CAPSULE ORAL EVERY 12 HOURS SCHEDULED
Qty: 14 CAPSULE | Refills: 0 | Status: SHIPPED | OUTPATIENT
Start: 2023-05-05 | End: 2023-05-12

## 2023-05-10 ENCOUNTER — TELEPHONE (OUTPATIENT)
Dept: OBGYN CLINIC | Facility: CLINIC | Age: 73
End: 2023-05-10

## 2023-05-20 ENCOUNTER — APPOINTMENT (OUTPATIENT)
Age: 73
End: 2023-05-20

## 2023-05-20 DIAGNOSIS — N18.2 TYPE 2 DIABETES MELLITUS WITH STAGE 2 CHRONIC KIDNEY DISEASE, WITHOUT LONG-TERM CURRENT USE OF INSULIN (HCC): ICD-10-CM

## 2023-05-20 DIAGNOSIS — E11.22 TYPE 2 DIABETES MELLITUS WITH STAGE 2 CHRONIC KIDNEY DISEASE, WITHOUT LONG-TERM CURRENT USE OF INSULIN (HCC): ICD-10-CM

## 2023-05-20 LAB
ALBUMIN SERPL BCP-MCNC: 3.4 G/DL (ref 3.5–5)
ALP SERPL-CCNC: 55 U/L (ref 46–116)
ALT SERPL W P-5'-P-CCNC: 31 U/L (ref 12–78)
ANION GAP SERPL CALCULATED.3IONS-SCNC: -1 MMOL/L (ref 4–13)
AST SERPL W P-5'-P-CCNC: 31 U/L (ref 5–45)
BILIRUB SERPL-MCNC: 0.61 MG/DL (ref 0.2–1)
BUN SERPL-MCNC: 10 MG/DL (ref 5–25)
CALCIUM ALBUM COR SERPL-MCNC: 10.1 MG/DL (ref 8.3–10.1)
CALCIUM SERPL-MCNC: 9.6 MG/DL (ref 8.3–10.1)
CHLORIDE SERPL-SCNC: 108 MMOL/L (ref 96–108)
CHOLEST SERPL-MCNC: 176 MG/DL
CO2 SERPL-SCNC: 31 MMOL/L (ref 21–32)
CREAT SERPL-MCNC: 0.92 MG/DL (ref 0.6–1.3)
EST. AVERAGE GLUCOSE BLD GHB EST-MCNC: 131 MG/DL
GFR SERPL CREATININE-BSD FRML MDRD: 61 ML/MIN/1.73SQ M
GLUCOSE P FAST SERPL-MCNC: 84 MG/DL (ref 65–99)
HBA1C MFR BLD: 6.2 %
HDLC SERPL-MCNC: 50 MG/DL
LDLC SERPL CALC-MCNC: 99 MG/DL (ref 0–100)
NONHDLC SERPL-MCNC: 126 MG/DL
POTASSIUM SERPL-SCNC: 5.1 MMOL/L (ref 3.5–5.3)
PROT SERPL-MCNC: 7.7 G/DL (ref 6.4–8.4)
SODIUM SERPL-SCNC: 138 MMOL/L (ref 135–147)
TRIGL SERPL-MCNC: 135 MG/DL

## 2023-06-02 ENCOUNTER — PREP FOR PROCEDURE (OUTPATIENT)
Dept: GASTROENTEROLOGY | Facility: CLINIC | Age: 73
End: 2023-06-02

## 2023-06-02 ENCOUNTER — TELEPHONE (OUTPATIENT)
Dept: GASTROENTEROLOGY | Facility: CLINIC | Age: 73
End: 2023-06-02

## 2023-06-02 DIAGNOSIS — Z12.11 SCREENING FOR COLON CANCER: Primary | ICD-10-CM

## 2023-06-02 NOTE — TELEPHONE ENCOUNTER
Scheduled date of colonoscopy (as of today): 08/29/23      Physician performing colonoscopy: Radha Diggs        Location of colonoscopy: [place]        Clearances: NONE

## 2023-07-21 DIAGNOSIS — N32.81 OVERACTIVE BLADDER: ICD-10-CM

## 2023-07-21 DIAGNOSIS — E11.9 TYPE 2 DIABETES MELLITUS WITHOUT COMPLICATION, WITHOUT LONG-TERM CURRENT USE OF INSULIN (HCC): ICD-10-CM

## 2023-07-24 RX ORDER — OXYBUTYNIN CHLORIDE 5 MG/1
5 TABLET ORAL 2 TIMES DAILY
Qty: 60 TABLET | Refills: 0 | Status: SHIPPED | OUTPATIENT
Start: 2023-07-24

## 2023-07-29 ENCOUNTER — OFFICE VISIT (OUTPATIENT)
Dept: OBGYN CLINIC | Facility: CLINIC | Age: 73
End: 2023-07-29

## 2023-07-29 VITALS
OXYGEN SATURATION: 98 % | RESPIRATION RATE: 18 BRPM | BODY MASS INDEX: 32.95 KG/M2 | HEIGHT: 66 IN | DIASTOLIC BLOOD PRESSURE: 78 MMHG | HEART RATE: 83 BPM | SYSTOLIC BLOOD PRESSURE: 117 MMHG | WEIGHT: 205 LBS

## 2023-07-29 DIAGNOSIS — M75.41 SUBACROMIAL IMPINGEMENT OF RIGHT SHOULDER: Primary | ICD-10-CM

## 2023-07-29 RX ORDER — MELOXICAM 7.5 MG/1
7.5 TABLET ORAL DAILY
Qty: 30 TABLET | Refills: 0 | Status: SHIPPED | OUTPATIENT
Start: 2023-07-29

## 2023-07-29 RX ORDER — BUPIVACAINE HYDROCHLORIDE 2.5 MG/ML
4 INJECTION, SOLUTION INFILTRATION; PERINEURAL
Status: COMPLETED | OUTPATIENT
Start: 2023-07-29 | End: 2023-07-29

## 2023-07-29 RX ORDER — TRIAMCINOLONE ACETONIDE 40 MG/ML
40 INJECTION, SUSPENSION INTRA-ARTICULAR; INTRAMUSCULAR
Status: COMPLETED | OUTPATIENT
Start: 2023-07-29 | End: 2023-07-29

## 2023-07-29 RX ADMIN — BUPIVACAINE HYDROCHLORIDE 4 ML: 2.5 INJECTION, SOLUTION INFILTRATION; PERINEURAL at 08:15

## 2023-07-29 RX ADMIN — TRIAMCINOLONE ACETONIDE 40 MG: 40 INJECTION, SUSPENSION INTRA-ARTICULAR; INTRAMUSCULAR at 08:15

## 2023-07-29 NOTE — PROGRESS NOTES
Subjective:  Chief Complaint   Patient presents with   • Right Shoulder - Clicking, Pain, Follow-up       Юлия Section is a 68 y.o. female complains of right shoulder pain. Onset of the symptoms was about a month ago. Mechanism of injury: none. Aggravating factors: overhead motion and reaching out . Treatment to date: avoidance of offending activity, corticosteroid injection which was effective and rest. Symptoms have gradually worsened. Was seen approximately 5 months ago and provided with CSI for right shoulder which gave her significant relief until about 3 weeks ago when pain recurred. Would like repeat injection . Did attend two sessions of pt but stopped going because felt it was not helping. Has not been doing consistent HEP. The following portions of the patient's history were reviewed and updated as appropriate: allergies, current medications, past family history, past medical history, past social history, past surgical history and problem list.    Occupation:      Review of Systems   Constitutional: Negative for fever. Objective:  /78   Pulse 83   Resp 18   Ht 5' 6" (1.676 m)   Wt 93 kg (205 lb)   SpO2 98%   BMI 33.09 kg/m²     Skin: no rashes, lesions, skin discolorations, lacerations  Vasculature: normal radial and ulnar pulse,  normal skin color, normal capillary refill in extremity, no upper extremity edema  Neurologic: Neurologic exam is normal throughout upper extremities, Awake, alert, and oriented x3, no apparent distress. Musculoskeletal:   right SHOULDER EXAM    Intact skin.   No erythema, no induration, no signs of infection  No gross deformity    AROM FF: 180 degrees with hiking  AROM ER with arm at its side: 30 degrees  AROM IR: lower lumbar    Grind test: negative    Supraspinatus strength testin-/5  Infraspinatus strength testin/5    Belly press: negative  Bear Hug test: negative    Empty can: positive  Biceps TTP: positive  Arc test: positive        Imaging:    XR chest pa & lateral    Result Date: 2/11/2023  Narrative: CHEST INDICATION:   R50.9: Fever, unspecified R06.02: Shortness of breath. Shortness of breath since Covid. COMPARISON:  CXR 11/17/2021 and chest CT 2/7/2009. EXAM PERFORMED/VIEWS:  XR CHEST PA & LATERAL FINDINGS: Cardiomediastinal silhouette appears unremarkable. The lungs are clear. No pneumothorax or pleural effusion. Osseous structures appear within normal limits for patient age. Impression: No acute cardiopulmonary disease. Workstation performed: [id number]        Assessment/Plan:  1. Subacromial impingement of right shoulder    Patient is presenting for follow-up visit in regards to right shoulder subacromial bursitis. She responded well to cortisone injection in the past approximately 5 months ago which gave her about 4-1/2 months of relief. I have provided her with another cortisone injection for subacromial bursa in office today without complication. Her exam reveals positive impingement signs and some mild weakness with supraspinatus testing. We discussed that there is a possibility for underlying rotator cuff tear I did recommend we can look into it further with an MRI however she declines desire for any surgical intervention and is able to change her management I advised that we can hold on MRI at today's visit. She is also exhibiting some restriction with external rotation, the remainder assessment of planes of motion are within normal limits-raises suspicion of possible development of adhesive capsulitis. We will continue to monitor. Advised the patient continue with home exercise program for the shoulder. Return again in about 3 months for reevaluation.   Meloxicam sent to the pharmacy 7.5 mg daily as needed for pain

## 2023-07-29 NOTE — PROGRESS NOTES
Large joint arthrocentesis: R subacromial bursa  Universal Protocol:  Consent: Verbal consent obtained. Risks and benefits: risks, benefits and alternatives were discussed  Consent given by: patient    Supporting Documentation  Indications: pain   Procedure Details  Location: shoulder - R subacromial bursa  Needle size: 22 G  Approach: posterior  Medications administered: 4 mL bupivacaine 0.25 %; 40 mg triamcinolone acetonide 40 mg/mL    Patient tolerance: patient tolerated the procedure well with no immediate complications    Risks and benefits of CSI were discussed with patient extensively. Risks were highlighted which included but were not limited to infection, pain, local site swelling, and chance that injection may not be effective. Patient was also counseled regarding glucose elevation days after receiving CSI and to be mindful of diet and check sugars daily. Patient agreeable to proceed with CSI after counseling.

## 2023-08-20 DIAGNOSIS — N32.81 OVERACTIVE BLADDER: ICD-10-CM

## 2023-08-20 DIAGNOSIS — E11.9 TYPE 2 DIABETES MELLITUS WITHOUT COMPLICATION, WITHOUT LONG-TERM CURRENT USE OF INSULIN (HCC): ICD-10-CM

## 2023-08-21 RX ORDER — OXYBUTYNIN CHLORIDE 5 MG/1
5 TABLET ORAL 2 TIMES DAILY
Qty: 180 TABLET | Refills: 1 | OUTPATIENT
Start: 2023-08-21

## 2023-08-25 DIAGNOSIS — M75.41 SUBACROMIAL IMPINGEMENT OF RIGHT SHOULDER: ICD-10-CM

## 2023-08-25 RX ORDER — MELOXICAM 7.5 MG/1
7.5 TABLET ORAL DAILY
Qty: 30 TABLET | Refills: 0 | Status: SHIPPED | OUTPATIENT
Start: 2023-08-25

## 2023-08-28 ENCOUNTER — TELEPHONE (OUTPATIENT)
Age: 73
End: 2023-08-28

## 2023-08-28 NOTE — TELEPHONE ENCOUNTER
Patients GI provider: DR Claudia Musa    Number to return call: [phone]    Reason for call: Pt calling stating she did not receive any procedure instructions for procedure tomorrow. Emailed miralax/dulcolax instructions to [email or web address]  All procedure instructions reviewed via telephone in detail. .    Patient states she did not receive any procedure instructions. Patient states she did not have anything today besides milk at 8am and coffee at 4am.   Please contact patient to further discuss if she can proceed.     Scheduled procedure/appointment date if applicable: 1/97/0380 COLON @ [place]

## 2023-08-28 NOTE — TELEPHONE ENCOUNTER
Spoke with patient. She had a milk shake at 8 AM this morning since then she states that she has not had anything to eat. I advised her to proceed with the colonoscopy prep as instructed.

## 2023-08-28 NOTE — TELEPHONE ENCOUNTER
Pt calling again regarding the same, stated she has been drinking chocolate milk and was told she would receive a call, asking for a call back as soon as possible.

## 2023-08-29 ENCOUNTER — ANESTHESIA EVENT (OUTPATIENT)
Dept: GASTROENTEROLOGY | Facility: HOSPITAL | Age: 73
End: 2023-08-29

## 2023-08-29 ENCOUNTER — ANESTHESIA (OUTPATIENT)
Dept: GASTROENTEROLOGY | Facility: HOSPITAL | Age: 73
End: 2023-08-29

## 2023-08-29 ENCOUNTER — HOSPITAL ENCOUNTER (OUTPATIENT)
Dept: GASTROENTEROLOGY | Facility: HOSPITAL | Age: 73
Setting detail: OUTPATIENT SURGERY
Discharge: HOME | End: 2023-08-29
Attending: INTERNAL MEDICINE
Payer: COMMERCIAL

## 2023-08-29 VITALS
OXYGEN SATURATION: 98 % | WEIGHT: 201.94 LBS | BODY MASS INDEX: 32.45 KG/M2 | HEART RATE: 75 BPM | HEIGHT: 66 IN | DIASTOLIC BLOOD PRESSURE: 68 MMHG | TEMPERATURE: 97.8 F | SYSTOLIC BLOOD PRESSURE: 108 MMHG | RESPIRATION RATE: 16 BRPM

## 2023-08-29 DIAGNOSIS — Z12.11 SCREENING FOR COLON CANCER: ICD-10-CM

## 2023-08-29 LAB — GLUCOSE SERPL-MCNC: 117 MG/DL (ref 65–140)

## 2023-08-29 PROCEDURE — 82948 REAGENT STRIP/BLOOD GLUCOSE: CPT

## 2023-08-29 RX ORDER — PROPOFOL 10 MG/ML
INJECTION, EMULSION INTRAVENOUS AS NEEDED
Status: DISCONTINUED | OUTPATIENT
Start: 2023-08-29 | End: 2023-08-29

## 2023-08-29 RX ORDER — LIDOCAINE HYDROCHLORIDE 10 MG/ML
INJECTION, SOLUTION EPIDURAL; INFILTRATION; INTRACAUDAL; PERINEURAL AS NEEDED
Status: DISCONTINUED | OUTPATIENT
Start: 2023-08-29 | End: 2023-08-29

## 2023-08-29 RX ORDER — SODIUM CHLORIDE, SODIUM LACTATE, POTASSIUM CHLORIDE, CALCIUM CHLORIDE 600; 310; 30; 20 MG/100ML; MG/100ML; MG/100ML; MG/100ML
INJECTION, SOLUTION INTRAVENOUS CONTINUOUS PRN
Status: DISCONTINUED | OUTPATIENT
Start: 2023-08-29 | End: 2023-08-29

## 2023-08-29 RX ADMIN — PROPOFOL 150 MG: 10 INJECTION, EMULSION INTRAVENOUS at 07:50

## 2023-08-29 RX ADMIN — PROPOFOL 50 MG: 10 INJECTION, EMULSION INTRAVENOUS at 07:56

## 2023-08-29 RX ADMIN — SODIUM CHLORIDE, SODIUM LACTATE, POTASSIUM CHLORIDE, AND CALCIUM CHLORIDE: .6; .31; .03; .02 INJECTION, SOLUTION INTRAVENOUS at 07:25

## 2023-08-29 RX ADMIN — LIDOCAINE HYDROCHLORIDE 5 ML: 10 INJECTION, SOLUTION EPIDURAL; INFILTRATION; INTRACAUDAL; PERINEURAL at 07:50

## 2023-08-29 NOTE — ANESTHESIA PREPROCEDURE EVALUATION
Procedure:  COLONOSCOPY    Relevant Problems   ENDO   (+) Type 2 diabetes mellitus with stage 2 chronic kidney disease, without long-term current use of insulin (HCC)      MUSCULOSKELETAL   (+) Arthritis      NEURO/PSYCH   (+) Chronic right shoulder pain      PULMONARY   (+) Shortness of breath     Obesity  Left Ventricle Left ventricular cavity size is normal. Wall thickness is normal. The left ventricular ejection fraction is 55%. Systolic function is normal.  Wall motion is normal. Diastolic function is mildly abnormal, consistent with grade I (abnormal) relaxation. Right Ventricle Right ventricular cavity size is normal. Systolic function is normal. Wall thickness is normal.      Left Atrium The atrium is normal in size. Right Atrium The atrium is normal in size. Aortic Valve The aortic valve was not well visualized. The aortic valve is trileaflet. The aortic valve has no significant stenosis. Mitral Valve There is mild annular calcification. There is trace regurgitation. There is no evidence of stenosis. Tricuspid Valve Tricuspid valve structure is normal. There is mild regurgitation. There is no evidence of stenosis. Pulmonic Valve Pulmonic valve structure is normal. There is trace regurgitation. There is no evidence of stenosis. Ascending Aorta The aortic root is normal in size. Pericardium There is no pericardial effusion. The pericardium is normal in appearance. Physical Exam    Airway    Mallampati score: II  TM Distance: >3 FB  Neck ROM: full     Dental       Cardiovascular  Rhythm: regular,     Pulmonary  Breath sounds clear to auscultation,     Other Findings        Anesthesia Plan  ASA Score- 2     Anesthesia Type- IV sedation with anesthesia with ASA Monitors. Additional Monitors:   Airway Plan:           Plan Factors-    Chart reviewed. Patient summary reviewed. Patient is not a current smoker.          Induction- intravenous. Postoperative Plan-     Informed Consent- Anesthetic plan and risks discussed with patient. I personally reviewed this patient with the CRNA. Discussed and agreed on the Anesthesia Plan with the CRNA. Joana Ibrahim

## 2023-08-29 NOTE — INTERVAL H&P NOTE
H&P reviewed. After examining the patient I find no changes in the patients condition since the H&P had been written.     Vitals:    08/29/23 0703   BP: 116/70   Pulse: 72   Resp: 16   Temp: 97.8 °F (36.6 °C)   SpO2: 97%

## 2023-08-29 NOTE — H&P
History and Physical -  [facility]  Jennifer Hair 68 y.o. female MRN: [id number]                  HPI: Jennifer Hair is a 68y.o. year old female who presents for colonoscopy for average risk screening. Last colonoscopy more than 10 years ago      REVIEW OF SYSTEMS: Per the HPI, and otherwise unremarkable. Historical Information   Past Medical History:   Diagnosis Date   • 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[address])    • Skin disorder    • Vascular disorder      No past surgical history on file. Social History   Social History     Substance and Sexual Activity   Alcohol Use No     Social History     Substance and Sexual Activity   Drug Use No     Social History     Tobacco Use   Smoking Status Never   Smokeless Tobacco Never     Family History   Problem Relation Age of Onset   • Diabetes Mother    • Hypertension Mother    • Ovarian cancer Mother 80   • Breast cancer Sister 61   • Breast cancer Maternal Aunt 46   • No Known Problems Father    • No Known Problems Maternal Grandmother    • No Known Problems Maternal Grandfather    • No Known Problems Paternal Grandmother    • No Known Problems Paternal Grandfather    • Prostate cancer Neg Hx 61   • BRCA2 Positive Neg Hx    • BRCA1 Positive Neg Hx    • BRCA2 Negative Neg Hx    • BRCA 1/2 Neg Hx    • BRCA1 Negative Neg Hx    • Endometrial cancer Neg Hx    • Breast cancer additional onset Neg Hx    • Colon cancer Neg Hx        Meds/Allergies     (Not in a hospital admission)      No Known Allergies    Objective     There were no vitals taken for this visit.       PHYSICAL EXAM    Gen: NAD  CV: RRR  CHEST: Clear  ABD: soft, NT/ND  EXT: no edema  Neuro: AAO      ASSESSMENT/PLAN:  This is a 68y.o. year old female here for average risk screening    PLAN:   Procedure: Colonoscopy

## 2023-08-29 NOTE — ANESTHESIA POSTPROCEDURE EVALUATION
Post-Op Assessment Note    CV Status:  Stable    Pain management: adequate     Mental Status:  Awake   Hydration Status:  Euvolemic   PONV Controlled:  Controlled   Airway Patency:  Patent      Post Op Vitals Reviewed: Yes      Staff: CRNA         No notable events documented.     /73 (08/29/23 0806)    Temp 97.8 °F (36.6 °C) (08/29/23 0806)    Pulse 74 (08/29/23 0806)   Resp 16 (08/29/23 0806)    SpO2 98 % (08/29/23 0806)

## 2023-09-14 LAB
LEFT EYE DIABETIC RETINOPATHY: NORMAL
RIGHT EYE DIABETIC RETINOPATHY: NORMAL

## 2023-09-20 ENCOUNTER — APPOINTMENT (EMERGENCY)
Dept: CT IMAGING | Facility: HOSPITAL | Age: 73
End: 2023-09-20
Payer: COMMERCIAL

## 2023-09-20 ENCOUNTER — TELEPHONE (OUTPATIENT)
Dept: PHYSICAL THERAPY | Facility: OTHER | Age: 73
End: 2023-09-20

## 2023-09-20 ENCOUNTER — HOSPITAL ENCOUNTER (EMERGENCY)
Facility: HOSPITAL | Age: 73
Discharge: HOME | End: 2023-09-20
Attending: EMERGENCY MEDICINE
Payer: COMMERCIAL

## 2023-09-20 VITALS
SYSTOLIC BLOOD PRESSURE: 101 MMHG | DIASTOLIC BLOOD PRESSURE: 55 MMHG | OXYGEN SATURATION: 99 % | HEART RATE: 67 BPM | TEMPERATURE: 98.7 F | RESPIRATION RATE: 17 BRPM

## 2023-09-20 DIAGNOSIS — M54.9 BACK PAIN: Primary | ICD-10-CM

## 2023-09-20 DIAGNOSIS — N39.0 UTI (URINARY TRACT INFECTION): ICD-10-CM

## 2023-09-20 LAB
ALBUMIN SERPL BCP-MCNC: 3.9 G/DL (ref 3.5–5)
ALP SERPL-CCNC: 57 U/L (ref 34–104)
ALT SERPL W P-5'-P-CCNC: 17 U/L (ref 7–52)
ANION GAP SERPL CALCULATED.3IONS-SCNC: 7 MMOL/L
AST SERPL W P-5'-P-CCNC: 17 U/L (ref 13–39)
BACTERIA UR QL AUTO: ABNORMAL /HPF
BASOPHILS # BLD AUTO: 0.05 THOUSANDS/ÂΜL (ref 0–0.1)
BASOPHILS NFR BLD AUTO: 1 % (ref 0–1)
BILIRUB SERPL-MCNC: 0.6 MG/DL (ref 0.2–1)
BILIRUB UR QL STRIP: NEGATIVE
BUN SERPL-MCNC: 16 MG/DL (ref 5–25)
CALCIUM SERPL-MCNC: 9.2 MG/DL (ref 8.4–10.2)
CHLORIDE SERPL-SCNC: 104 MMOL/L (ref 96–108)
CLARITY UR: CLEAR
CO2 SERPL-SCNC: 25 MMOL/L (ref 21–32)
COLOR UR: ABNORMAL
CREAT SERPL-MCNC: 0.84 MG/DL (ref 0.6–1.3)
EOSINOPHIL # BLD AUTO: 0.41 THOUSAND/ÂΜL (ref 0–0.61)
EOSINOPHIL NFR BLD AUTO: 4 % (ref 0–6)
ERYTHROCYTE [DISTWIDTH] IN BLOOD BY AUTOMATED COUNT: 13.9 % (ref 11.6–15.1)
GFR SERPL CREATININE-BSD FRML MDRD: 69 ML/MIN/1.73SQ M
GLUCOSE SERPL-MCNC: 203 MG/DL (ref 65–140)
GLUCOSE UR STRIP-MCNC: NEGATIVE MG/DL
HCT VFR BLD AUTO: 37.7 % (ref 34.8–46.1)
HGB BLD-MCNC: 12.4 G/DL (ref 11.5–15.4)
HGB UR QL STRIP.AUTO: NEGATIVE
IMM GRANULOCYTES # BLD AUTO: 0.02 THOUSAND/UL (ref 0–0.2)
IMM GRANULOCYTES NFR BLD AUTO: 0 % (ref 0–2)
KETONES UR STRIP-MCNC: NEGATIVE MG/DL
LEUKOCYTE ESTERASE UR QL STRIP: ABNORMAL
LYMPHOCYTES # BLD AUTO: 1.91 THOUSANDS/ÂΜL (ref 0.6–4.47)
LYMPHOCYTES NFR BLD AUTO: 19 % (ref 14–44)
MCH RBC QN AUTO: 26.4 PG (ref 26.8–34.3)
MCHC RBC AUTO-ENTMCNC: 32.9 G/DL (ref 31.4–37.4)
MCV RBC AUTO: 80 FL (ref 82–98)
MONOCYTES # BLD AUTO: 0.82 THOUSAND/ÂΜL (ref 0.17–1.22)
MONOCYTES NFR BLD AUTO: 8 % (ref 4–12)
NEUTROPHILS # BLD AUTO: 6.83 THOUSANDS/ÂΜL (ref 1.85–7.62)
NEUTS SEG NFR BLD AUTO: 68 % (ref 43–75)
NITRITE UR QL STRIP: POSITIVE
NON-SQ EPI CELLS URNS QL MICRO: ABNORMAL /HPF
NRBC BLD AUTO-RTO: 0 /100 WBCS
PH UR STRIP.AUTO: 5.5 [PH]
PLATELET # BLD AUTO: 236 THOUSANDS/UL (ref 149–390)
PMV BLD AUTO: 11.6 FL (ref 8.9–12.7)
POTASSIUM SERPL-SCNC: 4.1 MMOL/L (ref 3.5–5.3)
PROT SERPL-MCNC: 7.4 G/DL (ref 6.4–8.4)
PROT UR STRIP-MCNC: NEGATIVE MG/DL
RBC # BLD AUTO: 4.7 MILLION/UL (ref 3.81–5.12)
RBC #/AREA URNS AUTO: ABNORMAL /HPF
SODIUM SERPL-SCNC: 136 MMOL/L (ref 135–147)
SP GR UR STRIP.AUTO: 1.01 (ref 1–1.03)
UROBILINOGEN UR STRIP-ACNC: <2 MG/DL
WBC # BLD AUTO: 10.04 THOUSAND/UL (ref 4.31–10.16)
WBC #/AREA URNS AUTO: ABNORMAL /HPF

## 2023-09-20 PROCEDURE — 87186 SC STD MICRODIL/AGAR DIL: CPT | Performed by: EMERGENCY MEDICINE

## 2023-09-20 PROCEDURE — 74176 CT ABD & PELVIS W/O CONTRAST: CPT

## 2023-09-20 PROCEDURE — 99284 EMERGENCY DEPT VISIT MOD MDM: CPT

## 2023-09-20 PROCEDURE — 81001 URINALYSIS AUTO W/SCOPE: CPT | Performed by: EMERGENCY MEDICINE

## 2023-09-20 PROCEDURE — 96365 THER/PROPH/DIAG IV INF INIT: CPT

## 2023-09-20 PROCEDURE — 36415 COLL VENOUS BLD VENIPUNCTURE: CPT | Performed by: EMERGENCY MEDICINE

## 2023-09-20 PROCEDURE — 96375 TX/PRO/DX INJ NEW DRUG ADDON: CPT

## 2023-09-20 PROCEDURE — 80053 COMPREHEN METABOLIC PANEL: CPT | Performed by: EMERGENCY MEDICINE

## 2023-09-20 PROCEDURE — 85025 COMPLETE CBC W/AUTO DIFF WBC: CPT | Performed by: EMERGENCY MEDICINE

## 2023-09-20 PROCEDURE — 87077 CULTURE AEROBIC IDENTIFY: CPT | Performed by: EMERGENCY MEDICINE

## 2023-09-20 PROCEDURE — 99284 EMERGENCY DEPT VISIT MOD MDM: CPT | Performed by: EMERGENCY MEDICINE

## 2023-09-20 PROCEDURE — 87086 URINE CULTURE/COLONY COUNT: CPT | Performed by: EMERGENCY MEDICINE

## 2023-09-20 RX ORDER — NAPROXEN 500 MG/1
500 TABLET ORAL 2 TIMES DAILY PRN
Qty: 20 TABLET | Refills: 0 | Status: SHIPPED | OUTPATIENT
Start: 2023-09-20

## 2023-09-20 RX ORDER — METHOCARBAMOL 500 MG/1
500 TABLET, FILM COATED ORAL 2 TIMES DAILY PRN
Qty: 20 TABLET | Refills: 0 | Status: SHIPPED | OUTPATIENT
Start: 2023-09-20 | End: 2023-09-21

## 2023-09-20 RX ORDER — LIDOCAINE 50 MG/G
2 PATCH TOPICAL ONCE
Status: DISCONTINUED | OUTPATIENT
Start: 2023-09-20 | End: 2023-09-20 | Stop reason: HOSPADM

## 2023-09-20 RX ORDER — LIDOCAINE 50 MG/G
1 PATCH TOPICAL DAILY
Qty: 10 PATCH | Refills: 0 | Status: SHIPPED | OUTPATIENT
Start: 2023-09-20

## 2023-09-20 RX ORDER — CEFTRIAXONE 1 G/50ML
1000 INJECTION, SOLUTION INTRAVENOUS ONCE
Status: COMPLETED | OUTPATIENT
Start: 2023-09-20 | End: 2023-09-20

## 2023-09-20 RX ORDER — KETOROLAC TROMETHAMINE 30 MG/ML
15 INJECTION, SOLUTION INTRAMUSCULAR; INTRAVENOUS ONCE
Status: COMPLETED | OUTPATIENT
Start: 2023-09-20 | End: 2023-09-20

## 2023-09-20 RX ORDER — CEFDINIR 300 MG/1
300 CAPSULE ORAL EVERY 12 HOURS SCHEDULED
Qty: 10 CAPSULE | Refills: 0 | Status: SHIPPED | OUTPATIENT
Start: 2023-09-20 | End: 2023-09-25

## 2023-09-20 RX ADMIN — CEFTRIAXONE 1000 MG: 1 INJECTION, SOLUTION INTRAVENOUS at 03:41

## 2023-09-20 RX ADMIN — LIDOCAINE 2 PATCH: 50 PATCH CUTANEOUS at 01:39

## 2023-09-20 RX ADMIN — KETOROLAC TROMETHAMINE 15 MG: 30 INJECTION, SOLUTION INTRAMUSCULAR; INTRAVENOUS at 03:15

## 2023-09-20 NOTE — ED PROVIDER NOTES
History  Chief Complaint   Patient presents with   • Back Pain     Patient c/o lower bilateral back pain. Patient reports the pain began after a colonoscopy  back on August 20th. Patient denies nausea or vomiting. No fall or injury. 68 y.o. female presents with several days of lower back pain with radiation down the bilateral legs. Described as "burning" pain that came on suddenly without inciting event and continues in the emergency room. Patient states certain movements worsens the pain and nothing has improves the pain. Patient denies pain worse at night. Patient notes a similar pain after she had a colonoscopy last month though this improved after she treated it with heat pads. Patient states the pain returned over the weekend and progressively worsened since then. Patient denies any loss of bowel or bladder. Patient does note urinary frequency and hesitancy stating "I got an urge to go but I don't."  Patient denies any motor loss or sensory loss, including saddle anesthesia. Patient denies nausea/vomiting. Patient denies anterior abdominal pain. Patient denies any history of malignancy, recent trauma, or recent illnesses. Patient denies any recent history of back procedures. Patient denies any history of aortic aneurysm. Patient denies any use of intravenous drugs. Focused Objective:   Abdomen: Non-distended. Nontender. Obese. Skin: Warm and dry. Back: Normal inspection without scoliosis or hyperkyphosis. ROM of spine is normal though slowed. Patient notes pain located bilateral paraspinally in the lumbar region into the sciatic notch. Tenderness to palpation in this region. Slowed but otherwise normal stand (S1) and sit (S3). Gait is slowed but otherwise normal.  Reflexes: patellar (L4) and ankle (S1) normal. Passive RoM of the knee without pain. Normal resistance dorsiflex great toes bilaterally (L5).  Sensation normal on the legs including the anterior medial thigh (L2), medial epicondyle femur (L3), medial malleolus (L4), dorsal 3rd MTP (L5), lateral calcaneus (S1), popliteal fossa (S2). Normal SLR and cross leg raise. Medical Decision Making   Patient presenting with acute onset back pain. Patient has acute nontraumatic back pain for less than 6 weeks and has no evidence for cauda equina, spinal infection, and no history of malignancy. The patient did not have any urinary incontinence, bowel incontinence, saddle anesthesia, fever, or weight loss. Considering the onset of symptoms occurred initially after colonoscopy, will obtain CT imaging of patient's abdomen pelvis to evaluate for intra-abdominal etiology of her symptoms though considering symptoms resolved and returned, less likely and more likely lumbar pathology. Patient does have pain in distribution of the sciatic nerve which may have been irritated by the positioning potentially. Fortunately patient does not have signs or symptoms of cauda equina. I discussed these red flag symptoms in detail with the patient. Patient does have urinary symptoms but does not have pain in the CVA's, will obtain urinalysis and laboratory evaluation to check kidney function though I am doubtful that the pain is secondary to renal pathology based on clinical examination. We will treat patient symptomatically and refer patient to comprehensive [facility]. I have discussed return precautions in detail with the patient need for follow-up. Prior to Admission Medications   Prescriptions Last Dose Informant Patient Reported? Taking?    Ascorbic Acid (VITAMIN C) 500 MG CAPS  Self Yes No   Sig: Take by mouth   Blood Glucose Monitoring Suppl ([address]) w/Device KIT  Self No No   Sig: by Does not apply route daily   ONE TOUCH CLUB LANCETS MISC  Self No No   Sig: by Does not apply route daily   ONE TOUCH ULTRA TEST test strip  Self No No   Sig: USE AS INSTRUCTED   Omega-3 Fatty Acids (FISH OIL) 1,000 mg  Self Yes No Sig: Take 1,000 mg by mouth daily   aspirin 81 mg chewable tablet  Self Yes No   Sig: Chew 1 tablet daily   meclizine (ANTIVERT) 25 mg tablet  Self No No   Sig: Take 1 tablet (25 mg total) by mouth every 8 (eight) hours as needed for dizziness   meloxicam (MOBIC) 7.5 mg tablet   No No   Sig: TAKE 1 TABLET BY MOUTH EVERY DAY   metFORMIN (GLUCOPHAGE) 500 mg tablet  Self No No   Sig: Take 1 tablet (500 mg total) by mouth 2 (two) times a day with meals   oxybutynin (DITROPAN) 5 mg tablet  Self No No   Sig: Take 1 tablet (5 mg total) by mouth 2 (two) times a day      Facility-Administered Medications: None       Past Medical History:   Diagnosis Date   • 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[address])    • Diabetes mellitus ([address])    • Skin disorder    • Vascular disorder        Past Surgical History:   Procedure Laterality Date   • COLONOSCOPY         Family History   Problem Relation Age of Onset   • Diabetes Mother    • Hypertension Mother    • Ovarian cancer Mother 80   • Breast cancer Sister 61   • Breast cancer Maternal Aunt 46   • No Known Problems Father    • No Known Problems Maternal Grandmother    • No Known Problems Maternal Grandfather    • No Known Problems Paternal Grandmother    • No Known Problems Paternal Grandfather    • Prostate cancer Neg Hx 60   • BRCA2 Positive Neg Hx    • BRCA1 Positive Neg Hx    • BRCA2 Negative Neg Hx    • BRCA 1/2 Neg Hx    • BRCA1 Negative Neg Hx    • Endometrial cancer Neg Hx    • Breast cancer additional onset Neg Hx    • Colon cancer Neg Hx      I have reviewed and agree with the history as documented.     E-Cigarette/Vaping   • E-Cigarette Use Never User      E-Cigarette/Vaping Substances   • Nicotine No    • THC No    • CBD No    • Flavoring No    • Other No    • Unknown No      Social History     Tobacco Use   • Smoking status: Never   • Smokeless tobacco: Never   Vaping Use   • Vaping Use: Never used   Substance Use Topics   • Alcohol use: No   • Drug use: No       Review of Systems    Physical Exam  Physical Exam    Vital Signs  ED Triage Vitals   Temperature Pulse Respirations Blood Pressure SpO2   09/20/23 0042 09/20/23 0042 09/20/23 0042 09/20/23 0042 09/20/23 0042   98.7 °F (37.1 °C) 89 18 108/55 100 %      Temp src Heart Rate Source Patient Position - Orthostatic VS BP Location FiO2 (%)   -- 09/20/23 0318 09/20/23 0318 09/20/23 0318 --    Monitor Lying Left arm       Pain Score       09/20/23 0042       10 - Worst Possible Pain           Vitals:    09/20/23 0042 09/20/23 0318   BP: 108/55 101/55   Pulse: 89 67   Patient Position - Orthostatic VS:  Lying         Visual Acuity      ED Medications  Medications   lidocaine (LIDODERM) 5 % patch 2 patch (2 patches Topical Medication Applied 9/20/23 0139)   ketorolac (TORADOL) injection 15 mg (15 mg Intravenous Given 9/20/23 0315)   cefTRIAXone (ROCEPHIN) IVPB (premix in dextrose) 1,000 mg 50 mL (0 mg Intravenous Stopped 9/20/23 0411)       Diagnostic Studies  Results Reviewed     Procedure Component Value Units Date/Time    Urine Microscopic [[id number]]  (Abnormal) Collected: 09/20/23 0323    Lab Status: Final result Specimen: Urine, Clean Catch Updated: 09/20/23 0349     RBC, UA 1-2 /hpf      WBC, UA 20-30 /hpf      Epithelial Cells Occasional /hpf      Bacteria, UA Innumerable /hpf     Urine culture [[id number]] Collected: 09/20/23 0323    Lab Status:  In process Specimen: Urine, Clean Catch Updated: 09/20/23 0349    UA w Reflex to Microscopic w Reflex to Culture [[id number]]  (Abnormal) Collected: 09/20/23 0323    Lab Status: Final result Specimen: Urine, Clean Catch Updated: 09/20/23 0329     Color, UA Light Yellow     Clarity, UA Clear     Specific Gravity, UA 1.013     pH, UA 5.5     Leukocytes, UA Large     Nitrite, UA Positive     Protein, UA Negative mg/dl      Glucose, UA Negative mg/dl      Ketones, UA Negative mg/dl      Urobilinogen, UA <2.0 mg/dl      Bilirubin, UA Negative     Occult Blood, UA Negative    Comprehensive metabolic panel [[id number]]  (Abnormal) Collected: 09/20/23 0137    Lab Status: Final result Specimen: Blood from Arm, Right Updated: 09/20/23 0154     Sodium 136 mmol/L      Potassium 4.1 mmol/L      Chloride 104 mmol/L      CO2 25 mmol/L      ANION GAP 7 mmol/L      BUN 16 mg/dL      Creatinine 0.84 mg/dL      Glucose 203 mg/dL      Calcium 9.2 mg/dL      AST 17 U/L      ALT 17 U/L      Alkaline Phosphatase 57 U/L      Total Protein 7.4 g/dL      Albumin 3.9 g/dL      Total Bilirubin 0.60 mg/dL      eGFR 69 ml/min/1.73sq m     Narrative:      [place] guidelines for Chronic Kidney Disease (CKD):   •  Stage 1 with normal or high GFR (GFR > 90 mL/min/1.73 square meters)  •  Stage 2 Mild CKD (GFR = 60-89 mL/min/1.73 square meters)  •  Stage 3A Moderate CKD (GFR = 45-59 mL/min/1.73 square meters)  •  Stage 3B Moderate CKD (GFR = 30-44 mL/min/1.73 square meters)  •  Stage 4 Severe CKD (GFR = 15-29 mL/min/1.73 square meters)  •  Stage 5 End Stage CKD (GFR <15 mL/min/1.73 square meters)  Note: GFR calculation is accurate only with a steady state creatinine    CBC and differential [[id number]]  (Abnormal) Collected: 09/20/23 0137    Lab Status: Final result Specimen: Blood from Arm, Right Updated: 09/20/23 0142     WBC 10.04 Thousand/uL      RBC 4.70 Million/uL      Hemoglobin 12.4 g/dL      Hematocrit 37.7 %      MCV 80 fL      MCH 26.4 pg      MCHC 32.9 g/dL      RDW 13.9 %      MPV 11.6 fL      Platelets 890 Thousands/uL      nRBC 0 /100 WBCs      Neutrophils Relative 68 %      Immat GRANS % 0 %      Lymphocytes Relative 19 %      Monocytes Relative 8 %      Eosinophils Relative 4 %      Basophils Relative 1 %      Neutrophils Absolute 6.83 Thousands/µL      Immature Grans Absolute 0.02 Thousand/uL      Lymphocytes Absolute 1.91 Thousands/µL      Monocytes Absolute 0.82 Thousand/µL      Eosinophils Absolute 0.41 Thousand/µL      Basophils Absolute 0.05 Thousands/µL                  CT abdomen pelvis wo contrast   Final Result by Valentino Leiter, MD (09/20 8618)      No acute abnormality identified within limitations of noncontrast exam.      Colonic diverticulosis. Workstation performed: [id number]                    Procedures  Procedures         ED Course  ED Course as of 09/20/23 0443   Wed Sep 20, 2023   0227 Creatinine: 0.84   0335 Nitrite, UA(!): Positive   0408 Patient laboratory evaluation does demonstrate findings concerning for urinary tract infection. It is unclear to me whether patient's back pain is secondary to this considering the distribution I do feel this is more likely musculoskeletal etiology. Patient does have L4-L5 anterolisthesis which could be the source of her symptoms though I emphasized diagnostic uncertainty and the need for continued follow-up. We will refer patient to comprehensive [facility] however we will also refer her to her primary care considering urinary tract infection to monitor the urine culture which is outstanding and I discussed with her. Discussed continued symptomatic management with the patient. Discussed return precautions in detail. MDM    Disposition  Final diagnoses:   Back pain   UTI (urinary tract infection)     Time reflects when diagnosis was documented in both MDM as applicable and the Disposition within this note     Time User Action Codes Description Comment    9/20/2023  2:58 AM Millie Boyle Add [M54.9] Back pain     9/20/2023  4:10 AM Reynoldyn Tamar Add [N39.0] UTI (urinary tract infection)       ED Disposition     ED Disposition   Discharge    Condition   Stable    Date/Time   Wed Sep 20, 2023  2:58 AM    Comment   Rita Gonzalez discharge to home/self care.                Follow-up Information     Follow up With Specialties Details Why Contact Info Additional Information    [facility] Physical Therapy Schedule an appointment as soon as possible for a visit Follow-up and reassessment [phone] [address] [facility] Emergency [facility] Go to  If symptoms worsen [address] [facility] Emergency Department, Dinora Monreal [facility], [place], [address] [facility] [place], [place] Family Medicine, Physician Assistant Schedule an appointment as soon as possible for a visit in 3 days Follow-up and reassessment. Monitoring of your back pain and urinary tract infection including review of your urine culture. [address]  [address] [facility]  [phone]             Discharge Medication List as of 9/20/2023  4:11 AM      START taking these medications    Details   cefdinir (OMNICEF) 300 mg capsule Take 1 capsule (300 mg total) by mouth every 12 (twelve) hours for 5 days, Starting Wed 9/20/2023, Until Mon 9/25/2023, Print      lidocaine (Lidoderm) 5 % Apply 1 patch topically over 12 hours daily Remove & Discard patch within 12 hours or as directed by MD, Starting Wed 9/20/2023, Print      methocarbamol (ROBAXIN) 500 mg tablet Take 1 tablet (500 mg total) by mouth 2 (two) times a day as needed for muscle spasms, Starting Wed 9/20/2023, Print      naproxen (NAPROSYN) 500 mg tablet Take 1 tablet (500 mg total) by mouth 2 (two) times a day as needed for moderate pain for up to 20 doses, Starting Wed 9/20/2023, Print         CONTINUE these medications which have NOT CHANGED    Details   Ascorbic Acid (VITAMIN C) 500 MG CAPS Take by mouth, Starting Wed 4/20/2011, Historical Med      aspirin 81 mg chewable tablet Chew 1 tablet daily, Starting u 7/28/2016, Historical Med      Blood Glucose Monitoring Suppl ([address]) w/Device KIT by Does not apply route daily, Starting Wed 2/21/2018, Normal      meclizine (ANTIVERT) 25 mg tablet Take 1 tablet (25 mg total) by mouth every 8 (eight) hours as needed for dizziness, Starting Mon 10/3/2022, Normal      meloxicam (MOBIC) 7.5 mg tablet TAKE 1 TABLET BY MOUTH EVERY DAY, Starting Fri 8/25/2023, Normal      metFORMIN (GLUCOPHAGE) 500 mg tablet Take 1 tablet (500 mg total) by mouth 2 (two) times a day with meals, Starting Mon 7/24/2023, Normal      Omega-3 Fatty Acids (FISH OIL) 1,000 mg Take 1,000 mg by mouth daily, Historical Med      ONE TOUCH CLUB LANCETS MISC by Does not apply route daily, Starting Wed 2/21/2018, Normal      ONE TOUCH ULTRA TEST test strip USE AS INSTRUCTED, Normal      oxybutynin (DITROPAN) 5 mg tablet Take 1 tablet (5 mg total) by mouth 2 (two) times a day, Starting Mon 7/24/2023, Normal                 PDMP Review     None          ED Provider  Electronically Signed by           Jluio Sheppard MD  09/20/23 6318

## 2023-09-20 NOTE — TELEPHONE ENCOUNTER
Call placed to the patient per [facility] referral.    Spoke with the patient who declined triage at the time the call was placed.      She will call [facility] back if interested  closed

## 2023-09-20 NOTE — Clinical Note
All Licea was seen and treated in our emergency department on 9/20/2023. No restrictions            Diagnosis:     Gaudencio Butler  may return to work on return date. She may return on this date: 09/26/2023         If you have any questions or concerns, please don't hesitate to call.       Mckenna Lucero MD    ______________________________           _______________          _______________  Hospital Representative                              Date                                Time

## 2023-09-20 NOTE — ED NOTES
Pt aware of need for urine sample. Unable to urinate at this time. Will notify staff when able.       Danya Reilly RN  09/20/23 1037

## 2023-09-21 ENCOUNTER — OFFICE VISIT (OUTPATIENT)
Dept: FAMILY MEDICINE CLINIC | Facility: CLINIC | Age: 73
End: 2023-09-21
Payer: COMMERCIAL

## 2023-09-21 VITALS
SYSTOLIC BLOOD PRESSURE: 108 MMHG | BODY MASS INDEX: 33.14 KG/M2 | HEART RATE: 93 BPM | DIASTOLIC BLOOD PRESSURE: 66 MMHG | HEIGHT: 66 IN | TEMPERATURE: 97.9 F | OXYGEN SATURATION: 99 % | WEIGHT: 206.2 LBS

## 2023-09-21 DIAGNOSIS — M54.16 LUMBAR RADICULOPATHY, ACUTE: Primary | ICD-10-CM

## 2023-09-21 DIAGNOSIS — N39.0 URINARY TRACT INFECTION WITHOUT HEMATURIA, SITE UNSPECIFIED: ICD-10-CM

## 2023-09-21 PROCEDURE — 99213 OFFICE O/P EST LOW 20 MIN: CPT | Performed by: PHYSICIAN ASSISTANT

## 2023-09-21 RX ORDER — PREDNISONE 10 MG/1
TABLET ORAL
Qty: 21 TABLET | Refills: 0 | Status: SHIPPED | OUTPATIENT
Start: 2023-09-21

## 2023-09-21 RX ORDER — CYCLOBENZAPRINE HCL 10 MG
10 TABLET ORAL 2 TIMES DAILY PRN
Qty: 30 TABLET | Refills: 0 | Status: SHIPPED | OUTPATIENT
Start: 2023-09-21

## 2023-09-21 NOTE — PROGRESS NOTES
Assessment/Plan:     Diagnoses and all orders for this visit:    Lumbar radiculopathy, acute  Comments:  Hold meloxicam and Naprosyn. Prednisone taper ordered. Continue Robaxin and try Flexeril. Call spine and pain center tomorrow  Orders:  -     predniSONE 10 mg tablet; 6,5,4,3,2,1,  -     cyclobenzaprine (FLEXERIL) 10 mg tablet; Take 1 tablet (10 mg total) by mouth 2 (two) times a day as needed for muscle spasms    Urinary tract infection without hematuria, site unspecified  Comments:  Continue cephalosporin treatment. Awaiting sensitivities. Subjective:      Patient ID: Benita Chavez is a 68 y.o. female. Patient presents in the office for follow-up emergency room. Patient went to the emergency room on September 19 for low back pain. Is also having some dysuria. Was sent home with Naprosyn and antibiotic. Her urine culture is positive for infection activity is pending however patient states her urinary symptoms have improved. No longer has dysuria no fever and no hematuria. He does still have however have her low back pain. She states she has had low back pain for a month. It got really worse right after her colonoscopy. Been applying heat and taking Mobic without relief. States that the low back pain radiates down her whole left leg. Burning sensation across the buttocks. CAT scan was reviewed she does have degenerative changes of L4 and L5. [facility]  Patient did get a call from them however she thought it was just physical therapy and denied services. Today on exam patient has tenderness in the lower lumbar region in the left buttocks. Lower extremity strength is intact. We will start prednisone taper. Patient aware that this will raise her blood sugar. Take an extra metformin. Naprosyn and Doxy can.       The following portions of the patient's history were reviewed and updated as appropriate:   She   Patient Active Problem List Diagnosis Date Noted   • Encounter for annual routine gynecological examination 05/01/2023   • Urine frequency 05/01/2023   • Family history of cervical cancer 03/27/2023   • Increased urinary frequency 02/08/2023   • Chronic right shoulder pain 02/08/2023   • Close exposure to COVID-19 virus 01/04/2022   • Shortness of breath 11/17/2021   • Overactive bladder 11/17/2021   • Type 2 diabetes mellitus with stage 2 chronic kidney disease, without long-term current use of insulin ([address]) 11/17/2021   • Encounter for screening mammogram for malignant neoplasm of breast 05/24/2021   • Candidiasis of breast 11/13/2018   • Elevated liver enzymes 09/27/2018   • Trigger middle finger of right hand 09/14/2018   • Thyroid nodule 09/18/2017   • Abnormal finding on thyroid function test 08/31/2017   • TMJ syndrome 03/09/2017   • Type 2 diabetes mellitus without complication, without long-term current use of insulin ([address]) 06/13/2016   • Arthritis 06/18/2012   • Venous insufficiency 06/18/2012   • Urinary incontinence 06/18/2012     Current Outpatient Medications   Medication Sig Dispense Refill   • Ascorbic Acid (VITAMIN C) 500 MG CAPS Take by mouth     • aspirin 81 mg chewable tablet Chew 1 tablet daily     • Blood Glucose Monitoring Suppl ([address]) w/Device KIT by Does not apply route daily 1 each 0   • cefdinir (OMNICEF) 300 mg capsule Take 1 capsule (300 mg total) by mouth every 12 (twelve) hours for 5 days 10 capsule 0   • cyclobenzaprine (FLEXERIL) 10 mg tablet Take 1 tablet (10 mg total) by mouth 2 (two) times a day as needed for muscle spasms 30 tablet 0   • lidocaine (Lidoderm) 5 % Apply 1 patch topically over 12 hours daily Remove & Discard patch within 12 hours or as directed by MD 10 patch 0   • meloxicam (MOBIC) 7.5 mg tablet TAKE 1 TABLET BY MOUTH EVERY DAY 30 tablet 0   • metFORMIN (GLUCOPHAGE) 500 mg tablet Take 1 tablet (500 mg total) by mouth 2 (two) times a day with meals 60 tablet 0   • Omega-3 Fatty Acids (FISH OIL) 1,000 mg Take 1,000 mg by mouth daily     • ONE TOUCH CLUB LANCETS MISC by Does not apply route daily 100 each 1   • ONE TOUCH ULTRA TEST test strip USE AS INSTRUCTED 100 each 1   • oxybutynin (DITROPAN) 5 mg tablet Take 1 tablet (5 mg total) by mouth 2 (two) times a day 60 tablet 0   • predniSONE 10 mg tablet 6,5,4,3,2,1, 21 tablet 0   • meclizine (ANTIVERT) 25 mg tablet Take 1 tablet (25 mg total) by mouth every 8 (eight) hours as needed for dizziness (Patient not taking: Reported on 9/21/2023) 30 tablet 0   • naproxen (NAPROSYN) 500 mg tablet Take 1 tablet (500 mg total) by mouth 2 (two) times a day as needed for moderate pain for up to 20 doses (Patient not taking: Reported on 9/21/2023) 20 tablet 0     No current facility-administered medications for this visit. She has No Known Allergies. .    Review of Systems   Constitutional: Negative for activity change and appetite change. Respiratory: Negative for shortness of breath. Cardiovascular: Negative for chest pain. Gastrointestinal: Negative for abdominal pain. Genitourinary: Negative for difficulty urinating, dysuria, frequency and hematuria. Musculoskeletal: Positive for arthralgias, back pain and gait problem. Objective:        Physical Exam  Vitals and nursing note reviewed. Constitutional:       General: She is not in acute distress. Appearance: She is obese. She is not ill-appearing. HENT:      Head: Normocephalic and atraumatic. Right Ear: External ear normal.      Left Ear: External ear normal.   Cardiovascular:      Rate and Rhythm: Normal rate and regular rhythm. Heart sounds: Normal heart sounds. No murmur heard. Pulmonary:      Effort: Pulmonary effort is normal.      Breath sounds: Normal breath sounds. Musculoskeletal:      Right lower leg: No edema. Left lower leg: No edema. Comments: Tender lower lumbar spine. There is lower left buttocks. Radicular symptoms.   Lower extremity strength intact to passive resistance   Skin:     General: Skin is warm and dry. Neurological:      General: No focal deficit present. Mental Status: She is oriented to person, place, and time. Psychiatric:         Mood and Affect: Mood normal.         Behavior: Behavior normal.         Thought Content:  Thought content normal.         Judgment: Judgment normal.

## 2023-09-22 ENCOUNTER — NURSE TRIAGE (OUTPATIENT)
Dept: PHYSICAL THERAPY | Facility: OTHER | Age: 73
End: 2023-09-22

## 2023-09-22 DIAGNOSIS — M54.50 ACUTE BILATERAL LOW BACK PAIN, UNSPECIFIED WHETHER SCIATICA PRESENT: Primary | ICD-10-CM

## 2023-09-22 LAB — BACTERIA UR CULT: ABNORMAL

## 2023-09-22 NOTE — TELEPHONE ENCOUNTER
Additional Information  • Negative: Is this related to a work injury? • Negative: Is this related to an MVA? • Negative: Are you currently recieving homecare services? Background - Initial Assessment  Clinical complaint: Pain is bilat low back radiating down bilat buttocks and bilat legs to the knees. No numbness or tingling. Started Aug 20th, NKI. Pain started after she had a colonoscopy. Pain got better and then came back on 9/16/23. Pain is better with laying down. Worse with getting up, sitting or movement. Was seen in ED 9/20/23 and PCP 9/21/23.  No prior back pain or surgery  Date of onset: 8/20/23 better, and then worse on 9/16/23  Frequency of pain: constant  Quality of pain: burning    Protocols used: [facility] COMPREHENSIVE SPINE PROGRAM PROTOCOL

## 2023-09-22 NOTE — TELEPHONE ENCOUNTER
Additional Information  • Negative: Has the patient had unexplained weight loss? • Negative: Does the patient have a fever? • Negative: Is the patient experiencing blood in sputum? • Negative: Has the patient experienced major trauma? (fall from height, high speed collision, direct blow to spine) and is also experiencing nausea, light-headedness, or loss of consciousness? • Negative: Is the patient experiencing urine retention? • Negative: Is the patient experiencing acute drop foot or paralysis? • Negative: Is this a chronic condition? Protocols used:  AMB COMPREHENSIVE SPINE PROGRAM PROTOCOL      Nurse completed triage and NO RF s/s present. Referral entered for the [facility] and contact/phone number info given to her as well. Patient was unable to write down the number and nurse asked if she would like to enter the info into her cell. Assured the pt. It would not disconnect the call. Also offered to CB and instructed her to NOT answer. THIS RN called patient after the triage to leave a VM with the contact information for [facility]. Reminded she can call the site for scheduling. Patients information was sent to the preferred site and pt made aware clerical would be calling to schedule appointment. Nurse encouraged her to call site if she does not hear from clerical beforehand. Patient Agreed. Patient did not voice any additional questions or concerns at this time. Patient is aware current complaints, relevant dx, additional referrals and treatment/options will be discussed at the evaluation/consult. All information regarding plan to be evaluated by the therapist was reviewed. Patient is in agreement with nurse's explanation of intended care path discussed today. Patient very appreciative of CB and referral placement for the evaluation with an Advanced Spine Therapist.   Nurse wished her well and referral closed.

## 2023-10-04 ENCOUNTER — TELEPHONE (OUTPATIENT)
Age: 73
End: 2023-10-04

## 2023-10-04 NOTE — TELEPHONE ENCOUNTER
Patient is still experiencing pain and cannot move. Wants to know if an MRI can be ordered or if she needs to come in to be evaluated further.

## 2023-10-06 ENCOUNTER — OFFICE VISIT (OUTPATIENT)
Dept: FAMILY MEDICINE CLINIC | Facility: CLINIC | Age: 73
End: 2023-10-06
Payer: COMMERCIAL

## 2023-10-06 VITALS
RESPIRATION RATE: 16 BRPM | SYSTOLIC BLOOD PRESSURE: 106 MMHG | HEART RATE: 85 BPM | BODY MASS INDEX: 32.62 KG/M2 | OXYGEN SATURATION: 7 % | TEMPERATURE: 97.3 F | HEIGHT: 66 IN | WEIGHT: 203 LBS | DIASTOLIC BLOOD PRESSURE: 68 MMHG

## 2023-10-06 DIAGNOSIS — E11.9 TYPE 2 DIABETES MELLITUS WITHOUT COMPLICATION, WITHOUT LONG-TERM CURRENT USE OF INSULIN (HCC): ICD-10-CM

## 2023-10-06 DIAGNOSIS — M54.16 LUMBAR BACK PAIN WITH RADICULOPATHY AFFECTING LOWER EXTREMITY: Primary | ICD-10-CM

## 2023-10-06 DIAGNOSIS — M54.16 LUMBAR BACK PAIN WITH RADICULOPATHY AFFECTING LEFT LOWER EXTREMITY: ICD-10-CM

## 2023-10-06 DIAGNOSIS — M54.16 LUMBAR RADICULOPATHY, ACUTE: ICD-10-CM

## 2023-10-06 PROCEDURE — 99214 OFFICE O/P EST MOD 30 MIN: CPT | Performed by: INTERNAL MEDICINE

## 2023-10-06 RX ORDER — NAPROXEN 500 MG/1
500 TABLET ORAL 2 TIMES DAILY WITH MEALS
Qty: 60 TABLET | Refills: 5 | Status: SHIPPED | OUTPATIENT
Start: 2023-10-06

## 2023-10-06 RX ORDER — PREDNISONE 10 MG/1
TABLET ORAL
Qty: 21 TABLET | Refills: 0 | Status: SHIPPED | OUTPATIENT
Start: 2023-10-06

## 2023-10-06 RX ORDER — DIAZEPAM 10 MG/1
10 TABLET ORAL EVERY 12 HOURS PRN
Qty: 30 TABLET | Refills: 0 | Status: SHIPPED | OUTPATIENT
Start: 2023-10-06

## 2023-10-06 NOTE — PROGRESS NOTES
Name: Rita Gonzalez      : 1950      MRN: [id number]  Encounter Provider: Alex Mathis MD  Encounter Date: 10/6/2023   Encounter department: [facility]     1. Lumbar back pain with radiculopathy affecting lower extremity  -     XR spine lumbar minimum 4 views non injury; Future; Expected date: 10/06/2023  -     MRI lumbar spine wo contrast; Future; Expected date: 10/06/2023  -     diazepam (VALIUM) 10 mg tablet; Take 1 tablet (10 mg total) by mouth every 12 (twelve) hours as needed for anxiety  -     naproxen (Naprosyn) 500 mg tablet; Take 1 tablet (500 mg total) by mouth 2 (two) times a day with meals  -     naproxen (Naprosyn) 500 mg tablet; Take 1 tablet (500 mg total) by mouth 2 (two) times a day with meals    2. Lumbar back pain with radiculopathy affecting left lower extremity  -     Ambulatory Referral to Physical Therapy; Future    3. Lumbar radiculopathy, acute  Comments:  Hold meloxicam and Naprosyn. Prednisone taper ordered. Continue Robaxin and try Flexeril. Call spine and pain center tomorrow  Orders:  -     predniSONE 10 mg tablet; 6,5,4,3,2,1,    4. Type 2 diabetes mellitus without complication, without long-term current use of insulin ([facility])  Comments:    Diabetes is at goal continue low carb diet   Check blood sugar daily. Orders:  -     metFORMIN (GLUCOPHAGE) 500 mg tablet; Take 1 tablet (500 mg total) by mouth 2 (two) times a day with meals           Subjective      Patient was seen previously for the same problem placed on muscle relaxers Naprosyn and disown taper with some improvement that turned when the prednisone was finished     Back Pain  This is a recurrent problem. The current episode started 1 to 4 weeks ago. The problem occurs constantly. The problem has been waxing and waning since onset. The pain is present in the lumbar spine. The quality of the pain is described as stabbing and aching. The pain radiates to the left foot.  The pain is at a severity of 7/10. The pain is moderate. The pain is worse during the day. The symptoms are aggravated by bending, position and standing. Stiffness is present all day. Associated symptoms include leg pain. Pertinent negatives include no abdominal pain, chest pain, dysuria or fever. (No associated symptoms) Risk factors include history of steroid use, obesity and menopause. She has tried NSAIDs and muscle relaxant (Prednisone) for the symptoms. The treatment provided moderate relief. Review of Systems   Constitutional: Negative for chills and fever. HENT: Negative for ear pain and sore throat. Eyes: Negative for pain and visual disturbance. Respiratory: Negative for cough and shortness of breath. Cardiovascular: Negative for chest pain and palpitations. Gastrointestinal: Negative for abdominal pain and vomiting. Genitourinary: Negative for dysuria and hematuria. Musculoskeletal: Positive for back pain and gait problem. Negative for arthralgias. Skin: Negative for color change and rash. Neurological: Negative for seizures and syncope. All other systems reviewed and are negative.       Current Outpatient Medications on File Prior to Visit   Medication Sig   • Ascorbic Acid (VITAMIN C) 500 MG CAPS Take by mouth   • aspirin 81 mg chewable tablet Chew 1 tablet daily   • Blood Glucose Monitoring Suppl ([address]) w/Device KIT by Does not apply route daily   • cyclobenzaprine (FLEXERIL) 10 mg tablet Take 1 tablet (10 mg total) by mouth 2 (two) times a day as needed for muscle spasms   • lidocaine (Lidoderm) 5 % Apply 1 patch topically over 12 hours daily Remove & Discard patch within 12 hours or as directed by MD   • meclizine (ANTIVERT) 25 mg tablet Take 1 tablet (25 mg total) by mouth every 8 (eight) hours as needed for dizziness   • meloxicam (MOBIC) 7.5 mg tablet TAKE 1 TABLET BY MOUTH EVERY DAY   • naproxen (NAPROSYN) 500 mg tablet Take 1 tablet (500 mg total) by mouth 2 (two) times a day as needed for moderate pain for up to 20 doses   • Omega-3 Fatty Acids (FISH OIL) 1,000 mg Take 1,000 mg by mouth daily   • ONE TOUCH CLUB LANCETS MISC by Does not apply route daily   • ONE TOUCH ULTRA TEST test strip USE AS INSTRUCTED   • oxybutynin (DITROPAN) 5 mg tablet Take 1 tablet (5 mg total) by mouth 2 (two) times a day   • [DISCONTINUED] metFORMIN (GLUCOPHAGE) 500 mg tablet Take 1 tablet (500 mg total) by mouth 2 (two) times a day with meals   • [DISCONTINUED] predniSONE 10 mg tablet 6,5,4,3,2,1, (Patient not taking: Reported on 10/6/2023)       Objective     /68 (BP Location: Right arm, Patient Position: Sitting, Cuff Size: Large)   Pulse 85   Temp (!) 97.3 °F (36.3 °C) (Temporal)   Resp 16   Ht 5' 6" (1.676 m)   Wt 92.1 kg (203 lb)   SpO2 (!) 7%   BMI 32.77 kg/m²     Physical Exam  Constitutional:       General: She is not in acute distress. Appearance: She is well-developed. She is obese. HENT:      Right Ear: External ear normal.      Left Ear: External ear normal.      Nose: Nose normal.      Mouth/Throat:      Pharynx: No oropharyngeal exudate. Eyes:      Pupils: Pupils are equal, round, and reactive to light. Neck:      Thyroid: No thyromegaly. Vascular: No JVD. Cardiovascular:      Rate and Rhythm: Normal rate and regular rhythm. Heart sounds: Normal heart sounds. No murmur heard. No gallop. Pulmonary:      Effort: Pulmonary effort is normal. No respiratory distress. Breath sounds: Normal breath sounds. No wheezing or rales. Abdominal:      General: Bowel sounds are normal. There is no distension. Palpations: Abdomen is soft. There is no mass. Tenderness: There is no abdominal tenderness. Musculoskeletal:         General: No swelling or tenderness. Normal range of motion. Cervical back: Normal range of motion and neck supple. Right lower leg: No edema. Left lower leg: No edema.    Lymphadenopathy:      Cervical: No cervical adenopathy. Skin:     General: Skin is warm. Findings: No rash. Neurological:      Mental Status: She is alert and oriented to person, place, and time. Cranial Nerves: No cranial nerve deficit. Motor: No weakness. Coordination: Coordination normal.      Gait: Gait abnormal.      Deep Tendon Reflexes: Reflexes normal.      Comments: Limping gait   Psychiatric:         Mood and Affect: Mood normal.         Behavior: Behavior normal.         Thought Content:  Thought content normal.         Judgment: Judgment normal.       Jatinder Bo MD

## 2023-10-13 ENCOUNTER — APPOINTMENT (OUTPATIENT)
Age: 73
End: 2023-10-13
Payer: COMMERCIAL

## 2023-10-13 DIAGNOSIS — M54.16 LUMBAR BACK PAIN WITH RADICULOPATHY AFFECTING LOWER EXTREMITY: ICD-10-CM

## 2023-10-13 PROCEDURE — 72110 X-RAY EXAM L-2 SPINE 4/>VWS: CPT

## 2023-10-18 ENCOUNTER — TELEPHONE (OUTPATIENT)
Age: 73
End: 2023-10-18

## 2023-10-18 NOTE — TELEPHONE ENCOUNTER
Patient called to follow up on results of the Xray of the spine done on 10/13. Results not yet in chart.

## 2023-10-29 DIAGNOSIS — E11.9 TYPE 2 DIABETES MELLITUS WITHOUT COMPLICATION, WITHOUT LONG-TERM CURRENT USE OF INSULIN (HCC): ICD-10-CM

## 2023-10-30 ENCOUNTER — TELEPHONE (OUTPATIENT)
Age: 73
End: 2023-10-30

## 2023-10-30 DIAGNOSIS — M54.16 LUMBAR BACK PAIN WITH RADICULOPATHY AFFECTING LEFT LOWER EXTREMITY: Primary | ICD-10-CM

## 2023-10-30 NOTE — TELEPHONE ENCOUNTER
The patient called she would like a referral for pain management   call patient when the order is in thank you

## 2023-11-03 ENCOUNTER — OFFICE VISIT (OUTPATIENT)
Dept: FAMILY MEDICINE CLINIC | Facility: CLINIC | Age: 73
End: 2023-11-03
Payer: COMMERCIAL

## 2023-11-03 VITALS
SYSTOLIC BLOOD PRESSURE: 112 MMHG | WEIGHT: 202.2 LBS | OXYGEN SATURATION: 98 % | DIASTOLIC BLOOD PRESSURE: 72 MMHG | TEMPERATURE: 97.5 F | BODY MASS INDEX: 32.5 KG/M2 | HEIGHT: 66 IN | HEART RATE: 84 BPM

## 2023-11-03 DIAGNOSIS — R35.0 URINE FREQUENCY: ICD-10-CM

## 2023-11-03 DIAGNOSIS — E11.22 TYPE 2 DIABETES MELLITUS WITH STAGE 2 CHRONIC KIDNEY DISEASE, WITHOUT LONG-TERM CURRENT USE OF INSULIN: ICD-10-CM

## 2023-11-03 DIAGNOSIS — M54.16 LUMBAR BACK PAIN WITH RADICULOPATHY AFFECTING LEFT LOWER EXTREMITY: ICD-10-CM

## 2023-11-03 DIAGNOSIS — R19.7 DIARRHEA OF PRESUMED INFECTIOUS ORIGIN: Primary | ICD-10-CM

## 2023-11-03 DIAGNOSIS — N18.2 TYPE 2 DIABETES MELLITUS WITH STAGE 2 CHRONIC KIDNEY DISEASE, WITHOUT LONG-TERM CURRENT USE OF INSULIN: ICD-10-CM

## 2023-11-03 PROBLEM — A04.8: Status: ACTIVE | Noted: 2023-11-03

## 2023-11-03 PROBLEM — G89.29 CHRONIC BILATERAL LOW BACK PAIN WITHOUT SCIATICA: Status: ACTIVE | Noted: 2023-11-03

## 2023-11-03 PROBLEM — M54.50 CHRONIC BILATERAL LOW BACK PAIN WITHOUT SCIATICA: Status: ACTIVE | Noted: 2023-11-03

## 2023-11-03 PROCEDURE — 99214 OFFICE O/P EST MOD 30 MIN: CPT | Performed by: INTERNAL MEDICINE

## 2023-11-03 RX ORDER — METRONIDAZOLE 250 MG/1
250 TABLET ORAL EVERY 12 HOURS SCHEDULED
Qty: 14 TABLET | Refills: 0 | Status: SHIPPED | OUTPATIENT
Start: 2023-11-03 | End: 2023-11-10

## 2023-11-03 NOTE — ASSESSMENT & PLAN NOTE
Over the last 2 days patient has had watery diarrhea too numerous to count anytime she ate anything. No one has been sick in the family portably. She has been staying on clear liquids and today her diarrhea appears to have possibly decreased.   I told her to take Pepto-Bismol and also placed her on Flagyl

## 2023-11-03 NOTE — PROGRESS NOTES
Name: Gary Young      : 1950      MRN: [id number]  Encounter Provider: Margi Guajardo MD  Encounter Date: 11/3/2023   Encounter department: [facility]     1. Diarrhea of presumed infectious origin  Assessment & Plan:  Over the last 2 days patient has had watery diarrhea too numerous to count anytime she ate anything. No one has been sick in the family portably. She has been staying on clear liquids and today her diarrhea appears to have possibly decreased. I told her to take Pepto-Bismol and also placed her on Flagyl    Orders:  -     bismuth subsalicylate (PEPTO BISMOL) 524 mg/30 mL oral suspension; Take 15 mL (262 mg total) by mouth every 6 (six) hours as needed for indigestion  -     metroNIDAZOLE (FLAGYL) 250 mg tablet; Take 1 tablet (250 mg total) by mouth every 12 (twelve) hours for 7 days    2. Type 2 diabetes mellitus with stage 2 chronic kidney disease, without long-term current use of insulin   Assessment & Plan:  Her A1c's have mostly been in the upper fives however her last blood sugar was 200 but she was on steroids because of her back pain. Lab Results   Component Value Date    HGBA1C 6.2 (H) 2023         3. Urine frequency  Assessment & Plan:  She recently was treated for back pain and possible UTI with Macrodantin and her bug was sensitive to it      4. Lumbar back pain with radiculopathy affecting left lower extremity           Subjective      Diarrhea   This is a new problem. The current episode started yesterday. The problem occurs 2 to 4 times per day. The problem has been gradually improving. The stool consistency is described as Watery. The patient states that diarrhea does not awaken her from sleep. Associated symptoms include chills, a fever and increased flatus. Pertinent negatives include no abdominal pain, arthralgias, coughing, headaches, myalgias, vomiting or weight loss. Nothing aggravates the symptoms.  There are no known risk factors. She has tried increased fluids for the symptoms. The treatment provided mild relief. Review of Systems   Constitutional:  Positive for chills and fever. Negative for fatigue, unexpected weight change and weight loss. HENT:  Negative for congestion, ear pain, hearing loss, postnasal drip, sinus pressure, sore throat, trouble swallowing and voice change. Eyes:  Negative for visual disturbance. Respiratory:  Negative for cough, chest tightness, shortness of breath and wheezing. Cardiovascular:  Negative for chest pain, palpitations and leg swelling. Gastrointestinal:  Positive for diarrhea and flatus. Negative for abdominal distention, abdominal pain, anal bleeding, blood in stool, constipation, nausea and vomiting. Endocrine: Negative for cold intolerance, polydipsia, polyphagia and polyuria. Genitourinary:  Negative for dysuria, flank pain, frequency, hematuria and urgency. Musculoskeletal:  Positive for back pain. Negative for arthralgias, gait problem, joint swelling, myalgias and neck pain. Skin:  Negative for rash. Allergic/Immunologic: Negative for immunocompromised state. Neurological:  Negative for dizziness, syncope, facial asymmetry, weakness, light-headedness, numbness and headaches. Hematological:  Negative for adenopathy. Psychiatric/Behavioral:  Negative for confusion, sleep disturbance and suicidal ideas.         Current Outpatient Medications on File Prior to Visit   Medication Sig    Ascorbic Acid (VITAMIN C) 500 MG CAPS Take by mouth    aspirin 81 mg chewable tablet Chew 1 tablet daily    Blood Glucose Monitoring Suppl ([address]) w/Device KIT by Does not apply route daily    cyclobenzaprine (FLEXERIL) 10 mg tablet Take 1 tablet (10 mg total) by mouth 2 (two) times a day as needed for muscle spasms    diazepam (VALIUM) 10 mg tablet Take 1 tablet (10 mg total) by mouth every 12 (twelve) hours as needed for anxiety    meclizine (ANTIVERT) 25 mg tablet Take 1 tablet (25 mg total) by mouth every 8 (eight) hours as needed for dizziness    meloxicam (MOBIC) 7.5 mg tablet TAKE 1 TABLET BY MOUTH EVERY DAY    metFORMIN (GLUCOPHAGE) 500 mg tablet TAKE 1 TABLET BY MOUTH TWICE A DAY WITH MEALS    Omega-3 Fatty Acids (FISH OIL) 1,000 mg Take 1,000 mg by mouth daily    ONE TOUCH CLUB LANCETS MISC by Does not apply route daily    ONE TOUCH ULTRA TEST test strip USE AS INSTRUCTED    oxybutynin (DITROPAN) 5 mg tablet Take 1 tablet (5 mg total) by mouth 2 (two) times a day    [DISCONTINUED] naproxen (NAPROSYN) 500 mg tablet Take 1 tablet (500 mg total) by mouth 2 (two) times a day as needed for moderate pain for up to 20 doses    lidocaine (Lidoderm) 5 % Apply 1 patch topically over 12 hours daily Remove & Discard patch within 12 hours or as directed by MD (Patient not taking: Reported on 11/3/2023)    naproxen (Naprosyn) 500 mg tablet Take 1 tablet (500 mg total) by mouth 2 (two) times a day with meals (Patient not taking: Reported on 11/3/2023)    naproxen (Naprosyn) 500 mg tablet Take 1 tablet (500 mg total) by mouth 2 (two) times a day with meals (Patient not taking: Reported on 11/3/2023)    [DISCONTINUED] predniSONE 10 mg tablet 6,5,4,3,2,1, (Patient not taking: Reported on 11/3/2023)       Objective     /72 (BP Location: Left arm, Patient Position: Sitting, Cuff Size: Large)   Pulse 84   Temp 97.5 °F (36.4 °C) (Temporal)   Ht 5' 6" (1.676 m)   Wt 91.7 kg (202 lb 3.2 oz)   SpO2 98%   BMI 32.64 kg/m²     Physical Exam  Constitutional:       General: She is not in acute distress. Appearance: Normal appearance. She is well-developed. She is obese. HENT:      Right Ear: External ear normal.      Left Ear: External ear normal.      Nose: Nose normal.      Mouth/Throat:      Pharynx: No oropharyngeal exudate. Eyes:      Pupils: Pupils are equal, round, and reactive to light. Neck:      Thyroid: No thyromegaly. Vascular: No JVD.    Cardiovascular: Rate and Rhythm: Normal rate and regular rhythm. Heart sounds: Normal heart sounds. No murmur heard. No gallop. Pulmonary:      Effort: Pulmonary effort is normal. No respiratory distress. Breath sounds: Normal breath sounds. No wheezing or rales. Abdominal:      General: Bowel sounds are normal. There is no distension. Palpations: Abdomen is soft. There is no mass. Tenderness: There is no abdominal tenderness. Musculoskeletal:         General: No tenderness. Normal range of motion. Cervical back: Normal range of motion and neck supple. Right lower leg: No edema. Left lower leg: Edema present. Lymphadenopathy:      Cervical: No cervical adenopathy. Skin:     Findings: No rash. Neurological:      Mental Status: She is alert and oriented to person, place, and time. Cranial Nerves: No cranial nerve deficit. Coordination: Coordination normal.      Gait: Gait abnormal.      Comments: Mildly hunched stance and wide-based gait   Psychiatric:         Mood and Affect: Mood normal.         Behavior: Behavior normal.         Thought Content:  Thought content normal.         Judgment: Judgment normal.       Valentin Stephen MD

## 2023-11-03 NOTE — ASSESSMENT & PLAN NOTE
She recently was treated for back pain and possible UTI with Macrodantin and her bug was sensitive to it

## 2023-11-03 NOTE — ASSESSMENT & PLAN NOTE
Her A1c's have mostly been in the upper fives however her last blood sugar was 200 but she was on steroids because of her back pain.   Lab Results   Component Value Date    HGBA1C 6.2 (H) 05/20/2023

## 2023-11-03 NOTE — ASSESSMENT & PLAN NOTE
She did talk to pain and spine and was scheduled for an MRI but it had to be rescheduled and is due on the 10th

## 2023-11-10 ENCOUNTER — HOSPITAL ENCOUNTER (OUTPATIENT)
Dept: MRI IMAGING | Facility: HOSPITAL | Age: 73
End: 2023-11-10
Payer: COMMERCIAL

## 2023-11-10 DIAGNOSIS — M54.16 LUMBAR BACK PAIN WITH RADICULOPATHY AFFECTING LOWER EXTREMITY: ICD-10-CM

## 2023-11-10 PROCEDURE — 72148 MRI LUMBAR SPINE W/O DYE: CPT

## 2023-11-10 PROCEDURE — G1004 CDSM NDSC: HCPCS

## 2023-11-13 DIAGNOSIS — M54.16 LUMBAR BACK PAIN WITH RADICULOPATHY AFFECTING LEFT LOWER EXTREMITY: Primary | ICD-10-CM

## 2023-11-17 ENCOUNTER — OFFICE VISIT (OUTPATIENT)
Dept: FAMILY MEDICINE CLINIC | Facility: CLINIC | Age: 73
End: 2023-11-17
Payer: COMMERCIAL

## 2023-11-17 VITALS
OXYGEN SATURATION: 92 % | SYSTOLIC BLOOD PRESSURE: 122 MMHG | HEIGHT: 66 IN | HEART RATE: 69 BPM | WEIGHT: 205 LBS | BODY MASS INDEX: 32.95 KG/M2 | DIASTOLIC BLOOD PRESSURE: 76 MMHG | TEMPERATURE: 97.6 F | RESPIRATION RATE: 16 BRPM

## 2023-11-17 DIAGNOSIS — N18.2 TYPE 2 DIABETES MELLITUS WITH STAGE 2 CHRONIC KIDNEY DISEASE, WITHOUT LONG-TERM CURRENT USE OF INSULIN: Primary | ICD-10-CM

## 2023-11-17 DIAGNOSIS — Z23 ENCOUNTER FOR IMMUNIZATION: ICD-10-CM

## 2023-11-17 DIAGNOSIS — Z13.220 SCREENING, LIPID: ICD-10-CM

## 2023-11-17 DIAGNOSIS — E04.1 THYROID NODULE: ICD-10-CM

## 2023-11-17 DIAGNOSIS — M54.16 LUMBAR BACK PAIN WITH RADICULOPATHY AFFECTING LEFT LOWER EXTREMITY: ICD-10-CM

## 2023-11-17 DIAGNOSIS — E11.22 TYPE 2 DIABETES MELLITUS WITH STAGE 2 CHRONIC KIDNEY DISEASE, WITHOUT LONG-TERM CURRENT USE OF INSULIN: Primary | ICD-10-CM

## 2023-11-17 LAB — SL AMB POCT HEMOGLOBIN AIC: 6 (ref ?–6.5)

## 2023-11-17 PROCEDURE — 90662 IIV NO PRSV INCREASED AG IM: CPT

## 2023-11-17 PROCEDURE — 99214 OFFICE O/P EST MOD 30 MIN: CPT | Performed by: INTERNAL MEDICINE

## 2023-11-17 PROCEDURE — 83036 HEMOGLOBIN GLYCOSYLATED A1C: CPT | Performed by: INTERNAL MEDICINE

## 2023-11-17 PROCEDURE — 90471 IMMUNIZATION ADMIN: CPT

## 2023-11-17 RX ORDER — NEOMYCIN SULFATE, POLYMYXIN B SULFATE AND DEXAMETHASONE 3.5; 10000; 1 MG/ML; [USP'U]/ML; MG/ML
SUSPENSION/ DROPS OPHTHALMIC
COMMUNITY
Start: 2023-11-08

## 2023-11-17 RX ORDER — VALACYCLOVIR HYDROCHLORIDE 1 G/1
1000 TABLET, FILM COATED ORAL 3 TIMES DAILY
COMMUNITY
Start: 2023-11-08

## 2023-11-17 NOTE — PROGRESS NOTES
Name: Jhon Matute      : 1950      MRN: [id number]  Encounter Provider: Veronique Maya MD  Encounter Date: 2023   Encounter department: [facility]     1. Type 2 diabetes mellitus with stage 2 chronic kidney disease, without long-term current use of insulin   Assessment & Plan:    Lab Results   Component Value Date    HGBA1C 6.0 2023   great A1c with metformin    Orders:  -     POCT hemoglobin A1c    2. Thyroid nodule  Assessment & Plan:  Has had a thyroid nodule will recheck blood work    Orders:  -     TSH, 3rd generation with Free T4 reflex; Future    3. Lumbar back pain with radiculopathy affecting left lower extremity  Assessment & Plan:  Back pain is better she is awaiting an MRI    Orders:  -     Ambulatory referral to Spine & Pain Management; Future  -     Comprehensive metabolic panel; Future  -     CBC and differential; Future    4. Screening, lipid  -     Lipid panel; Future    5.  Encounter for immunization  -     influenza vaccine, high-dose, PF 0.7 mL (FLUZONE HIGH-DOSE)           Subjective      HPI  Review of Systems    Current Outpatient Medications on File Prior to Visit   Medication Sig    Ascorbic Acid (VITAMIN C) 500 MG CAPS Take by mouth    aspirin 81 mg chewable tablet Chew 1 tablet daily    bismuth subsalicylate (PEPTO BISMOL) 524 mg/30 mL oral suspension Take 15 mL (262 mg total) by mouth every 6 (six) hours as needed for indigestion    Blood Glucose Monitoring Suppl ([address]) w/Device KIT by Does not apply route daily    cyclobenzaprine (FLEXERIL) 10 mg tablet Take 1 tablet (10 mg total) by mouth 2 (two) times a day as needed for muscle spasms    diazepam (VALIUM) 10 mg tablet Take 1 tablet (10 mg total) by mouth every 12 (twelve) hours as needed for anxiety    meclizine (ANTIVERT) 25 mg tablet Take 1 tablet (25 mg total) by mouth every 8 (eight) hours as needed for dizziness    meloxicam (MOBIC) 7.5 mg tablet TAKE 1 TABLET BY MOUTH EVERY DAY    metFORMIN (GLUCOPHAGE) 500 mg tablet TAKE 1 TABLET BY MOUTH TWICE A DAY WITH MEALS    naproxen (Naprosyn) 500 mg tablet Take 1 tablet (500 mg total) by mouth 2 (two) times a day with meals    neomycin-polymyxin-dexamethasone (MAXITROL) ophthalmic suspension INSTILL 1 DROP TO RIGHT EYE 4 TIMES DAILY    Omega-3 Fatty Acids (FISH OIL) 1,000 mg Take 1,000 mg by mouth daily    ONE TOUCH CLUB LANCETS MISC by Does not apply route daily    ONE TOUCH ULTRA TEST test strip USE AS INSTRUCTED    oxybutynin (DITROPAN) 5 mg tablet Take 1 tablet (5 mg total) by mouth 2 (two) times a day    valACYclovir (VALTREX) 1,000 mg tablet Take 1,000 mg by mouth 3 (three) times a day    [DISCONTINUED] lidocaine (Lidoderm) 5 % Apply 1 patch topically over 12 hours daily Remove & Discard patch within 12 hours or as directed by MD (Patient not taking: Reported on 11/3/2023)    [DISCONTINUED] naproxen (Naprosyn) 500 mg tablet Take 1 tablet (500 mg total) by mouth 2 (two) times a day with meals (Patient not taking: Reported on 11/17/2023)       Objective     /76 (BP Location: Right arm, Patient Position: Sitting, Cuff Size: Large)   Pulse 69   Temp 97.6 °F (36.4 °C) (Temporal)   Resp 16   Ht 5' 6" (1.676 m)   Wt 93 kg (205 lb)   SpO2 92%   BMI 33.09 kg/m²     Physical Exam  Glen Pulliam MD

## 2023-11-17 NOTE — PROGRESS NOTES
Name: Rachelle Peña      : 1950      MRN: [id number]  Encounter Provider: Justin Rodriguez MD  Encounter Date: 2023   Encounter department: [facility]     1. Type 2 diabetes mellitus with stage 2 chronic kidney disease, without long-term current use of insulin   Assessment & Plan:    Lab Results   Component Value Date    HGBA1C 6.0 2023   great A1c with metformin    Orders:  -     POCT hemoglobin A1c    2. Thyroid nodule  Assessment & Plan:  Has had a thyroid nodule will recheck blood work    Orders:  -     TSH, 3rd generation with Free T4 reflex; Future    3. Lumbar back pain with radiculopathy affecting left lower extremity  Assessment & Plan:  Back pain is better she is awaiting an MRI    Orders:  -     Ambulatory referral to Spine & Pain Management; Future  -     Comprehensive metabolic panel; Future  -     CBC and differential; Future    4. Screening, lipid  -     Lipid panel; Future    5. Encounter for immunization  -     influenza vaccine, high-dose, PF 0.7 mL (FLUZONE HIGH-DOSE)      BMI Counseling: Body mass index is 33.09 kg/m². The BMI is above normal. Nutrition recommendations include decreasing portion sizes, encouraging healthy choices of fruits and vegetables, consuming healthier snacks, limiting drinks that contain sugar and moderation in carbohydrate intake. Exercise recommendations include exercising 3-5 times per week and strength training exercises. Rationale for BMI follow-up plan is due to patient being overweight or obese. Subjective      History recent complaint has been low back pain some sciatica doing going down her right thigh.  Is given a slip to see on pain and spine the order an MRI but as far as I can tell it has not been done yet she states that for the most part her pain is better except she still occasionally gets pain going down her thigh      Review of Systems   Constitutional:  Negative for chills, fatigue, fever and unexpected weight change. HENT:  Negative for congestion, ear pain, hearing loss, postnasal drip, sinus pressure, sore throat, trouble swallowing and voice change. Eyes:  Negative for visual disturbance. Respiratory:  Negative for cough, chest tightness, shortness of breath and wheezing. Cardiovascular:  Negative for chest pain, palpitations and leg swelling. Gastrointestinal:  Negative for abdominal distention, abdominal pain, anal bleeding, blood in stool, constipation, diarrhea and nausea. Endocrine: Negative for cold intolerance, polydipsia, polyphagia and polyuria. Genitourinary:  Positive for frequency. Negative for dysuria, flank pain, hematuria and urgency. Musculoskeletal:  Positive for back pain. Negative for arthralgias, gait problem, joint swelling, myalgias and neck pain. Skin:  Negative for rash. Allergic/Immunologic: Negative for immunocompromised state. Neurological:  Negative for dizziness, syncope, facial asymmetry, weakness, light-headedness, numbness and headaches. Hematological:  Negative for adenopathy. Psychiatric/Behavioral:  Negative for confusion, sleep disturbance and suicidal ideas.         Current Outpatient Medications on File Prior to Visit   Medication Sig    Ascorbic Acid (VITAMIN C) 500 MG CAPS Take by mouth    aspirin 81 mg chewable tablet Chew 1 tablet daily    bismuth subsalicylate (PEPTO BISMOL) 524 mg/30 mL oral suspension Take 15 mL (262 mg total) by mouth every 6 (six) hours as needed for indigestion    Blood Glucose Monitoring Suppl ([address]) w/Device KIT by Does not apply route daily    cyclobenzaprine (FLEXERIL) 10 mg tablet Take 1 tablet (10 mg total) by mouth 2 (two) times a day as needed for muscle spasms    diazepam (VALIUM) 10 mg tablet Take 1 tablet (10 mg total) by mouth every 12 (twelve) hours as needed for anxiety    meclizine (ANTIVERT) 25 mg tablet Take 1 tablet (25 mg total) by mouth every 8 (eight) hours as needed for dizziness    meloxicam (MOBIC) 7.5 mg tablet TAKE 1 TABLET BY MOUTH EVERY DAY    metFORMIN (GLUCOPHAGE) 500 mg tablet TAKE 1 TABLET BY MOUTH TWICE A DAY WITH MEALS    naproxen (Naprosyn) 500 mg tablet Take 1 tablet (500 mg total) by mouth 2 (two) times a day with meals    neomycin-polymyxin-dexamethasone (MAXITROL) ophthalmic suspension INSTILL 1 DROP TO RIGHT EYE 4 TIMES DAILY    Omega-3 Fatty Acids (FISH OIL) 1,000 mg Take 1,000 mg by mouth daily    ONE TOUCH CLUB LANCETS MISC by Does not apply route daily    ONE TOUCH ULTRA TEST test strip USE AS INSTRUCTED    oxybutynin (DITROPAN) 5 mg tablet Take 1 tablet (5 mg total) by mouth 2 (two) times a day    valACYclovir (VALTREX) 1,000 mg tablet Take 1,000 mg by mouth 3 (three) times a day    [DISCONTINUED] lidocaine (Lidoderm) 5 % Apply 1 patch topically over 12 hours daily Remove & Discard patch within 12 hours or as directed by MD (Patient not taking: Reported on 11/3/2023)    [DISCONTINUED] naproxen (Naprosyn) 500 mg tablet Take 1 tablet (500 mg total) by mouth 2 (two) times a day with meals (Patient not taking: Reported on 11/17/2023)       Objective     /76 (BP Location: Right arm, Patient Position: Sitting, Cuff Size: Large)   Pulse 69   Temp 97.6 °F (36.4 °C) (Temporal)   Resp 16   Ht 5' 6" (1.676 m)   Wt 93 kg (205 lb)   SpO2 92%   BMI 33.09 kg/m²     Physical Exam  Constitutional:       General: She is not in acute distress. Appearance: She is well-developed. She is obese. HENT:      Head: Normocephalic. Right Ear: External ear normal.      Left Ear: External ear normal.      Nose: Nose normal.      Mouth/Throat:      Pharynx: No oropharyngeal exudate. Eyes:      Pupils: Pupils are equal, round, and reactive to light. Neck:      Thyroid: No thyromegaly. Vascular: No JVD. Cardiovascular:      Rate and Rhythm: Normal rate and regular rhythm. Heart sounds: Normal heart sounds. No murmur heard. No gallop.    Pulmonary: Effort: Pulmonary effort is normal. No respiratory distress. Breath sounds: Normal breath sounds. No wheezing or rales. Abdominal:      General: Bowel sounds are normal. There is no distension. Palpations: Abdomen is soft. There is no mass. Tenderness: There is no abdominal tenderness. Musculoskeletal:         General: No tenderness. Normal range of motion. Cervical back: Normal range of motion and neck supple. Right lower leg: No edema. Left lower leg: No edema. Lymphadenopathy:      Cervical: No cervical adenopathy. Neurological:      Mental Status: She is alert and oriented to person, place, and time. Cranial Nerves: No cranial nerve deficit. Coordination: Coordination normal.      Gait: Gait abnormal.   Psychiatric:         Behavior: Behavior normal.         Thought Content:  Thought content normal.         Judgment: Judgment normal.       Wilberto Willis MD Lymphadenopathy:      Cervical: No cervical adenopathy. Neurological:      Mental Status: She is alert and oriented to person, place, and time. Cranial Nerves: No cranial nerve deficit. Coordination: Coordination normal.      Gait: Gait abnormal.   Psychiatric:         Behavior: Behavior normal.         Thought Content:  Thought content normal.         Judgment: Judgment normal.       Yakov Gaitan MD

## 2023-11-17 NOTE — ASSESSMENT & PLAN NOTE
Fortunately she has had abnormal thyroid tests in the past. He does not have the recheck in years still added to her current blood work

## 2024-01-02 ENCOUNTER — TELEPHONE (OUTPATIENT)
Dept: FAMILY MEDICINE CLINIC | Facility: CLINIC | Age: 74
End: 2024-01-02

## 2024-01-02 DIAGNOSIS — M54.16 LUMBAR BACK PAIN WITH RADICULOPATHY AFFECTING LOWER EXTREMITY: ICD-10-CM

## 2024-01-02 DIAGNOSIS — M54.16 LUMBAR BACK PAIN WITH RADICULOPATHY AFFECTING LEFT LOWER EXTREMITY: Primary | ICD-10-CM

## 2024-01-02 DIAGNOSIS — N32.81 OVERACTIVE BLADDER: ICD-10-CM

## 2024-01-02 PROBLEM — R19.7 DIARRHEA OF PRESUMED INFECTIOUS ORIGIN: Status: RESOLVED | Noted: 2023-11-03 | Resolved: 2024-01-02

## 2024-01-02 RX ORDER — NAPROXEN 500 MG/1
500 TABLET ORAL 2 TIMES DAILY WITH MEALS
Qty: 60 TABLET | Refills: 5 | Status: SHIPPED | OUTPATIENT
Start: 2024-01-02

## 2024-01-02 RX ORDER — OXYBUTYNIN CHLORIDE 5 MG/1
5 TABLET ORAL 2 TIMES DAILY
Qty: 60 TABLET | Refills: 5 | Status: SHIPPED | OUTPATIENT
Start: 2024-01-02

## 2024-01-02 NOTE — TELEPHONE ENCOUNTER
Reason for call:   [x] Refill   [] Prior Auth  [] Other:     Office:   [x] PCP/Provider - [facility]  [] Specialty/Provider -     Medication: naproxen (Naprosyn) 500 mg tablet -Take 1 tablet (500 mg total) by mouth 2 (two) times a day with meals     oxybutynin (DITROPAN) 5 mg tablet -Take 1 tablet (5 mg total) by mouth 2 (two) times a day         Quantity: 60/60    Pharmacy: [facility]     Does the patient have enough for 3 days?   [x] Yes   [] No - Send as HP to POD

## 2024-01-02 NOTE — TELEPHONE ENCOUNTER
Patient requesting refill of Methocarbamol 500 mg tablets.     She states it really helped her.     Please review and advise.

## 2024-01-03 RX ORDER — METHOCARBAMOL 500 MG/1
500 TABLET, FILM COATED ORAL 3 TIMES DAILY
Qty: 60 TABLET | Refills: 0 | Status: SHIPPED | OUTPATIENT
Start: 2024-01-03

## 2024-01-09 ENCOUNTER — CONSULT (OUTPATIENT)
Dept: FAMILY MEDICINE CLINIC | Facility: CLINIC | Age: 74
End: 2024-01-09
Payer: COMMERCIAL

## 2024-01-09 VITALS
HEIGHT: 66 IN | WEIGHT: 206.6 LBS | SYSTOLIC BLOOD PRESSURE: 112 MMHG | BODY MASS INDEX: 33.2 KG/M2 | HEART RATE: 75 BPM | DIASTOLIC BLOOD PRESSURE: 74 MMHG | TEMPERATURE: 98 F | OXYGEN SATURATION: 99 %

## 2024-01-09 DIAGNOSIS — E11.22 TYPE 2 DIABETES MELLITUS WITH STAGE 2 CHRONIC KIDNEY DISEASE, WITHOUT LONG-TERM CURRENT USE OF INSULIN: ICD-10-CM

## 2024-01-09 DIAGNOSIS — Z01.818 PRE-OP EXAM: Primary | ICD-10-CM

## 2024-01-09 DIAGNOSIS — N18.2 TYPE 2 DIABETES MELLITUS WITH STAGE 2 CHRONIC KIDNEY DISEASE, WITHOUT LONG-TERM CURRENT USE OF INSULIN: ICD-10-CM

## 2024-01-09 DIAGNOSIS — M54.16 LUMBAR BACK PAIN WITH RADICULOPATHY AFFECTING LEFT LOWER EXTREMITY: ICD-10-CM

## 2024-01-09 DIAGNOSIS — H25.9 AGE-RELATED CATARACT OF BOTH EYES, UNSPECIFIED AGE-RELATED CATARACT TYPE: ICD-10-CM

## 2024-01-09 PROBLEM — H26.9 CATARACT: Status: ACTIVE | Noted: 2024-01-09

## 2024-01-09 PROBLEM — E11.9 TYPE 2 DIABETES MELLITUS (HCC): Status: ACTIVE | Noted: 2019-04-29

## 2024-01-09 PROCEDURE — 99213 OFFICE O/P EST LOW 20 MIN: CPT | Performed by: INTERNAL MEDICINE

## 2024-01-09 RX ORDER — METHYLPREDNISOLONE 4 MG/1
TABLET ORAL
Qty: 21 EACH | Refills: 0 | Status: SHIPPED | OUTPATIENT
Start: 2024-01-09

## 2024-01-09 NOTE — PROGRESS NOTES
Name: Stella Cleveland      : 1950      MRN: [id number]  Encounter Provider: Maggi Correa MD  Encounter Date: 2024   Encounter department: [facility]    Assessment & Plan     1. Pre-op exam  Assessment & Plan:  Patient is here for preop clearance for cataract surgery and is medically cleared for same.      2. Age-related cataract of both eyes, unspecified age-related cataract type    3. Lumbar back pain with radiculopathy affecting left lower extremity  Assessment & Plan:  Patient did have her MRI which does show significant disease and she was referred to pain and spine therapy and also to the pain and spine doctor. She insists that her insurance will pay for it so she is done none of those things. She also does not take her muscle relaxer is or anti-inflammatories on a regular basis. She insists that she needs FMLA papers taken so she can take off until her surgeries for her cataracts             Subjective      As mentioned elsewhere she is here for preop clearance for cataract surgery which she was approved for. However she wants FMLA papers for low back pain which is a chronic problem and she has been referred to pain and spine and physical therapy for but is not followed through. Her MRI does show significant back disease.      Review of Systems   Constitutional:  Negative for chills and fever.   HENT:  Negative for ear pain and sore throat.    Eyes:  Positive for visual disturbance. Negative for pain.   Respiratory:  Negative for cough and shortness of breath.    Cardiovascular:  Negative for chest pain and palpitations.   Gastrointestinal:  Negative for abdominal pain and vomiting.   Genitourinary:  Negative for dysuria and hematuria.   Musculoskeletal:  Positive for back pain and gait problem. Negative for arthralgias.   Skin:  Negative for color change and rash.   Neurological:  Negative for seizures and syncope.   All other systems reviewed and are negative.      Current  "Outpatient Medications on File Prior to Visit   Medication Sig    Ascorbic Acid (VITAMIN C) 500 MG CAPS Take by mouth    aspirin 81 mg chewable tablet Chew 1 tablet daily    bismuth subsalicylate (PEPTO BISMOL) 524 mg/30 mL oral suspension Take 15 mL (262 mg total) by mouth every 6 (six) hours as needed for indigestion    Blood Glucose Monitoring Suppl (ONE [employer] SYSTEM) w/Device KIT by Does not apply route daily    diazepam (VALIUM) 10 mg tablet Take 1 tablet (10 mg total) by mouth every 12 (twelve) hours as needed for anxiety    meclizine (ANTIVERT) 25 mg tablet Take 1 tablet (25 mg total) by mouth every 8 (eight) hours as needed for dizziness    metFORMIN (GLUCOPHAGE) 500 mg tablet TAKE 1 TABLET BY MOUTH TWICE A DAY WITH MEALS    methocarbamol (ROBAXIN) 500 mg tablet Take 1 tablet (500 mg total) by mouth 3 (three) times a day    naproxen (Naprosyn) 500 mg tablet Take 1 tablet (500 mg total) by mouth 2 (two) times a day with meals    Omega-3 Fatty Acids (FISH OIL) 1,000 mg Take 1,000 mg by mouth daily    ONE TOUCH CLUB LANCETS MISC by Does not apply route daily    ONE TOUCH ULTRA TEST test strip USE AS INSTRUCTED    oxybutynin (DITROPAN) 5 mg tablet Take 1 tablet (5 mg total) by mouth 2 (two) times a day    [DISCONTINUED] meloxicam (MOBIC) 7.5 mg tablet TAKE 1 TABLET BY MOUTH EVERY DAY (Patient taking differently: Take 7.5 mg by mouth daily As needed)    neomycin-polymyxin-dexamethasone (MAXITROL) ophthalmic suspension INSTILL 1 DROP TO RIGHT EYE 4 TIMES DAILY (Patient not taking: Reported on 1/9/2024)    valACYclovir (VALTREX) 1,000 mg tablet Take 1,000 mg by mouth 3 (three) times a day (Patient not taking: Reported on 1/9/2024)       Objective     /74 (BP Location: Right arm, Patient Position: Sitting, Cuff Size: Large)   Pulse 75   Temp 98 °F (36.7 °C) (Temporal)   Ht 5' 6\" (1.676 m)   Wt 93.7 kg (206 lb 9.6 oz)   SpO2 99%   BMI 33.35 kg/m²     Physical Exam  Constitutional:       General: She " is not in acute distress.     Appearance: She is well-developed.   HENT:      Right Ear: External ear normal.      Left Ear: External ear normal.      Nose: Nose normal.      Mouth/Throat:      Pharynx: No oropharyngeal exudate.   Eyes:      Pupils: Pupils are equal, round, and reactive to light.   Neck:      Thyroid: No thyromegaly.      Vascular: No JVD.   Cardiovascular:      Rate and Rhythm: Normal rate and regular rhythm.      Heart sounds: Normal heart sounds. No murmur heard.     No gallop.   Pulmonary:      Effort: Pulmonary effort is normal. No respiratory distress.      Breath sounds: Normal breath sounds. No wheezing or rales.   Abdominal:      General: Bowel sounds are normal. There is no distension.      Palpations: Abdomen is soft. There is no mass.      Tenderness: There is no abdominal tenderness.   Musculoskeletal:         General: No tenderness. Normal range of motion.      Cervical back: Normal range of motion and neck supple.   Lymphadenopathy:      Cervical: No cervical adenopathy.   Skin:     Findings: No rash.   Neurological:      Mental Status: She is alert and oriented to person, place, and time.      Cranial Nerves: No cranial nerve deficit.      Coordination: Coordination normal.   Psychiatric:         Behavior: Behavior normal.         Thought Content: Thought content normal.         Judgment: Judgment normal.       Maggi Correa MD

## 2024-01-09 NOTE — ASSESSMENT & PLAN NOTE
Patient did have her MRI which does show significant disease and she was referred to pain and spine therapy and also to the pain and spine doctor. She insists that her insurance will pay for it so she is done none of those things. She also does not take her muscle relaxer is or anti-inflammatories on a regular basis. She insists that she needs FMLA papers taken so she can take off until her surgeries for her cataracts

## 2024-01-29 DIAGNOSIS — N32.81 OVERACTIVE BLADDER: ICD-10-CM

## 2024-01-29 RX ORDER — OXYBUTYNIN CHLORIDE 5 MG/1
5 TABLET ORAL 2 TIMES DAILY
Qty: 60 TABLET | Refills: 5 | OUTPATIENT
Start: 2024-01-29

## 2024-01-29 NOTE — TELEPHONE ENCOUNTER
Refuse prescription called in 1/2/2024 for 60 tablets with 5 refill. Sent message on my chart for patient to call pharmacy for refill

## 2024-02-09 ENCOUNTER — CONSULT (OUTPATIENT)
Dept: FAMILY MEDICINE CLINIC | Facility: CLINIC | Age: 74
End: 2024-02-09
Payer: COMMERCIAL

## 2024-02-09 VITALS
DIASTOLIC BLOOD PRESSURE: 66 MMHG | OXYGEN SATURATION: 97 % | HEIGHT: 66 IN | HEART RATE: 84 BPM | BODY MASS INDEX: 33.01 KG/M2 | TEMPERATURE: 98.2 F | WEIGHT: 205.4 LBS | SYSTOLIC BLOOD PRESSURE: 112 MMHG

## 2024-02-09 DIAGNOSIS — E11.9 TYPE 2 DIABETES MELLITUS WITHOUT COMPLICATION, WITHOUT LONG-TERM CURRENT USE OF INSULIN (HCC): ICD-10-CM

## 2024-02-09 DIAGNOSIS — Z01.818 PRE-OP EXAM: Primary | ICD-10-CM

## 2024-02-09 DIAGNOSIS — H25.9 AGE-RELATED CATARACT OF BOTH EYES, UNSPECIFIED AGE-RELATED CATARACT TYPE: ICD-10-CM

## 2024-02-09 PROCEDURE — 99213 OFFICE O/P EST LOW 20 MIN: CPT | Performed by: INTERNAL MEDICINE

## 2024-02-09 NOTE — ASSESSMENT & PLAN NOTE
Lab Results   Component Value Date    HGBA1C 6.0 11/17/2023   well controlled diabetes on metformin

## 2024-02-09 NOTE — PROGRESS NOTES
Name: Stella Cleveland      : 1950      MRN: [id number]  Encounter Provider: Maggi Correa MD  Encounter Date: 2024   Encounter department: [facility]    Assessment & Plan     1. Pre-op exam    2. Age-related cataract of both eyes, unspecified age-related cataract type    3. Type 2 diabetes mellitus without complication, without long-term current use of insulin ([facility])  Assessment & Plan:    Lab Results   Component Value Date    HGBA1C 6.0 2023   well controlled diabetes on metformin             Subjective      Patient is here for clearance for for cataract surgery. At present she is no complaints and an A1c done in the office was 5.9.      Review of Systems   Constitutional:  Negative for chills and fever.   HENT:  Negative for ear pain and sore throat.    Eyes:  Positive for visual disturbance. Negative for pain.   Respiratory:  Negative for cough and shortness of breath.    Cardiovascular:  Negative for chest pain, palpitations and leg swelling.   Gastrointestinal:  Negative for abdominal pain and vomiting.   Genitourinary:  Negative for dysuria and hematuria.   Musculoskeletal:  Negative for arthralgias and back pain.   Skin:  Negative for color change and rash.   Neurological:  Negative for seizures and syncope.   Hematological:  Does not bruise/bleed easily.   Psychiatric/Behavioral: Negative.     All other systems reviewed and are negative.      Current Outpatient Medications on File Prior to Visit   Medication Sig    Ascorbic Acid (VITAMIN C) 500 MG CAPS Take by mouth    aspirin 81 mg chewable tablet Chew 1 tablet daily    bismuth subsalicylate (PEPTO BISMOL) 524 mg/30 mL oral suspension Take 15 mL (262 mg total) by mouth every 6 (six) hours as needed for indigestion    meclizine (ANTIVERT) 25 mg tablet Take 1 tablet (25 mg total) by mouth every 8 (eight) hours as needed for dizziness    metFORMIN (GLUCOPHAGE) 500 mg tablet TAKE 1 TABLET BY MOUTH TWICE A DAY WITH MEALS     "methocarbamol (ROBAXIN) 500 mg tablet Take 1 tablet (500 mg total) by mouth 3 (three) times a day    naproxen (Naprosyn) 500 mg tablet Take 1 tablet (500 mg total) by mouth 2 (two) times a day with meals    Omega-3 Fatty Acids (FISH OIL) 1,000 mg Take 1,000 mg by mouth daily    oxybutynin (DITROPAN) 5 mg tablet Take 1 tablet (5 mg total) by mouth 2 (two) times a day    ONE TOUCH CLUB LANCETS MISC by Does not apply route daily (Patient not taking: Reported on 2/9/2024)    ONE TOUCH ULTRA TEST test strip USE AS INSTRUCTED (Patient not taking: Reported on 2/9/2024)    [DISCONTINUED] Blood Glucose Monitoring Suppl (ONE TOUCH BASIC SYSTEM) w/Device KIT by Does not apply route daily (Patient not taking: Reported on 2/9/2024)    [DISCONTINUED] diazepam (VALIUM) 10 mg tablet Take 1 tablet (10 mg total) by mouth every 12 (twelve) hours as needed for anxiety (Patient not taking: Reported on 2/9/2024)    [DISCONTINUED] methylPREDNISolone 4 MG tablet therapy pack Use as directed on package (Patient not taking: Reported on 2/9/2024)    [DISCONTINUED] neomycin-polymyxin-dexamethasone (MAXITROL) ophthalmic suspension INSTILL 1 DROP TO RIGHT EYE 4 TIMES DAILY (Patient not taking: Reported on 1/9/2024)    [DISCONTINUED] valACYclovir (VALTREX) 1,000 mg tablet Take 1,000 mg by mouth 3 (three) times a day (Patient not taking: Reported on 1/9/2024)       Objective     /66 (BP Location: Right arm, Patient Position: Sitting, Cuff Size: Standard)   Pulse 84   Temp 98.2 °F (36.8 °C) (Temporal)   Ht 5' 6\" (1.676 m)   Wt 93.2 kg (205 lb 6.4 oz)   SpO2 97%   BMI 33.15 kg/m²     Physical Exam  Constitutional:       General: She is not in acute distress.     Appearance: She is well-developed. She is obese.   HENT:      Head: Normocephalic and atraumatic.      Right Ear: External ear normal.      Left Ear: External ear normal.      Nose: Nose normal. No congestion.      Mouth/Throat:      Mouth: Mucous membranes are moist.      " Pharynx: No oropharyngeal exudate.   Eyes:      Extraocular Movements: Extraocular movements intact.      Pupils: Pupils are equal, round, and reactive to light.   Neck:      Thyroid: No thyromegaly.      Vascular: No carotid bruit or JVD.   Cardiovascular:      Rate and Rhythm: Normal rate and regular rhythm.      Heart sounds: Normal heart sounds. No murmur heard.     No gallop.   Pulmonary:      Effort: Pulmonary effort is normal. No respiratory distress.      Breath sounds: Normal breath sounds. No wheezing or rales.   Abdominal:      General: Bowel sounds are normal. There is no distension.      Palpations: Abdomen is soft. There is no mass.      Tenderness: There is no abdominal tenderness.   Musculoskeletal:         General: No tenderness. Normal range of motion.      Cervical back: Normal range of motion and neck supple.      Right lower leg: No edema.      Left lower leg: No edema.   Lymphadenopathy:      Cervical: No cervical adenopathy.   Skin:     Findings: No bruising or rash.   Neurological:      General: No focal deficit present.      Mental Status: She is alert and oriented to person, place, and time.      Cranial Nerves: No cranial nerve deficit.      Coordination: Coordination normal.      Gait: Gait normal.   Psychiatric:         Mood and Affect: Mood normal.         Behavior: Behavior normal.         Thought Content: Thought content normal.         Judgment: Judgment normal.       Maggi Correa MD

## 2024-02-21 PROBLEM — Z01.419 ENCOUNTER FOR ANNUAL ROUTINE GYNECOLOGICAL EXAMINATION: Status: RESOLVED | Noted: 2023-05-01 | Resolved: 2024-02-21

## 2024-03-01 ENCOUNTER — TELEPHONE (OUTPATIENT)
Age: 74
End: 2024-03-01

## 2024-03-01 DIAGNOSIS — M54.16 LUMBAR BACK PAIN WITH RADICULOPATHY AFFECTING LEFT LOWER EXTREMITY: ICD-10-CM

## 2024-03-01 DIAGNOSIS — M54.16 LUMBAR BACK PAIN WITH RADICULOPATHY AFFECTING LOWER EXTREMITY: ICD-10-CM

## 2024-03-01 RX ORDER — METHOCARBAMOL 500 MG/1
500 TABLET, FILM COATED ORAL 3 TIMES DAILY
Qty: 60 TABLET | Refills: 0 | Status: CANCELLED | OUTPATIENT
Start: 2024-03-01

## 2024-03-01 RX ORDER — NAPROXEN 500 MG/1
500 TABLET ORAL 2 TIMES DAILY WITH MEALS
Qty: 60 TABLET | Refills: 5 | Status: CANCELLED | OUTPATIENT
Start: 2024-03-01

## 2024-03-01 NOTE — TELEPHONE ENCOUNTER
Spoke with patient, patient would like a phone call back in regards to the one touch machine with the strips. Please advise.

## 2024-03-04 NOTE — TELEPHONE ENCOUNTER
Unable to reach pt, home line is still busy. Attempted to call 's phone but the call  cannot be completed at this time.

## 2024-05-17 ENCOUNTER — TELEPHONE (OUTPATIENT)
Age: 74
End: 2024-05-17